# Patient Record
Sex: FEMALE | Race: WHITE | Employment: FULL TIME | URBAN - METROPOLITAN AREA
[De-identification: names, ages, dates, MRNs, and addresses within clinical notes are randomized per-mention and may not be internally consistent; named-entity substitution may affect disease eponyms.]

---

## 2017-12-20 ENCOUNTER — HOSPITAL ENCOUNTER (OUTPATIENT)
Dept: MAMMOGRAPHY | Facility: CLINIC | Age: 67
Discharge: HOME | End: 2017-12-20
Payer: MEDICARE

## 2017-12-20 DIAGNOSIS — Z12.31 VISIT FOR SCREENING MAMMOGRAM: ICD-10-CM

## 2017-12-20 PROCEDURE — G0202 SCR MAMMO BI INCL CAD: HCPCS

## 2018-12-21 ENCOUNTER — HOSPITAL ENCOUNTER (OUTPATIENT)
Dept: MAMMOGRAPHY | Facility: CLINIC | Age: 68
Discharge: HOME | End: 2018-12-21
Attending: FAMILY MEDICINE | Admitting: FAMILY MEDICINE
Payer: MEDICARE

## 2018-12-21 DIAGNOSIS — Z12.31 VISIT FOR SCREENING MAMMOGRAM: ICD-10-CM

## 2018-12-21 PROCEDURE — 77067 SCR MAMMO BI INCL CAD: CPT

## 2019-12-23 ENCOUNTER — HOSPITAL ENCOUNTER (OUTPATIENT)
Dept: MAMMOGRAPHY | Facility: CLINIC | Age: 69
Discharge: HOME | End: 2019-12-23
Attending: FAMILY MEDICINE | Admitting: FAMILY MEDICINE
Payer: COMMERCIAL

## 2019-12-23 DIAGNOSIS — Z12.31 VISIT FOR SCREENING MAMMOGRAM: ICD-10-CM

## 2019-12-23 PROCEDURE — 77067 SCR MAMMO BI INCL CAD: CPT

## 2020-01-13 ENCOUNTER — TRANSFERRED RECORDS (OUTPATIENT)
Dept: HEALTH INFORMATION MANAGEMENT | Facility: CLINIC | Age: 70
End: 2020-01-13

## 2020-01-20 ENCOUNTER — OFFICE VISIT (OUTPATIENT)
Dept: FAMILY MEDICINE | Facility: CLINIC | Age: 70
End: 2020-01-20
Payer: COMMERCIAL

## 2020-01-20 VITALS
TEMPERATURE: 97.9 F | HEART RATE: 95 BPM | BODY MASS INDEX: 23.74 KG/M2 | HEIGHT: 63 IN | OXYGEN SATURATION: 97 % | DIASTOLIC BLOOD PRESSURE: 84 MMHG | SYSTOLIC BLOOD PRESSURE: 132 MMHG | WEIGHT: 134 LBS

## 2020-01-20 DIAGNOSIS — R49.0 DYSPHONIA: ICD-10-CM

## 2020-01-20 DIAGNOSIS — R03.0 ELEVATED BLOOD PRESSURE READING WITHOUT DIAGNOSIS OF HYPERTENSION: ICD-10-CM

## 2020-01-20 DIAGNOSIS — J38.3 VOCAL CORD MASS: ICD-10-CM

## 2020-01-20 DIAGNOSIS — Z01.818 PREOP GENERAL PHYSICAL EXAM: Primary | ICD-10-CM

## 2020-01-20 LAB
ALBUMIN SERPL-MCNC: 3.5 G/DL (ref 3.4–5)
ALP SERPL-CCNC: 89 U/L (ref 40–150)
ALT SERPL W P-5'-P-CCNC: 29 U/L (ref 0–50)
ANION GAP SERPL CALCULATED.3IONS-SCNC: 6 MMOL/L (ref 3–14)
AST SERPL W P-5'-P-CCNC: 22 U/L (ref 0–45)
BILIRUB SERPL-MCNC: 0.3 MG/DL (ref 0.2–1.3)
BUN SERPL-MCNC: 9 MG/DL (ref 7–30)
CALCIUM SERPL-MCNC: 8.8 MG/DL (ref 8.5–10.1)
CHLORIDE SERPL-SCNC: 108 MMOL/L (ref 94–109)
CO2 SERPL-SCNC: 26 MMOL/L (ref 20–32)
CREAT SERPL-MCNC: 0.82 MG/DL (ref 0.52–1.04)
ERYTHROCYTE [DISTWIDTH] IN BLOOD BY AUTOMATED COUNT: 12.6 % (ref 10–15)
GFR SERPL CREATININE-BSD FRML MDRD: 72 ML/MIN/{1.73_M2}
GLUCOSE SERPL-MCNC: 111 MG/DL (ref 70–99)
HCT VFR BLD AUTO: 40.7 % (ref 35–47)
HGB BLD-MCNC: 13.3 G/DL (ref 11.7–15.7)
MCH RBC QN AUTO: 30 PG (ref 26.5–33)
MCHC RBC AUTO-ENTMCNC: 32.7 G/DL (ref 31.5–36.5)
MCV RBC AUTO: 92 FL (ref 78–100)
PLATELET # BLD AUTO: 200 10E9/L (ref 150–450)
POTASSIUM SERPL-SCNC: 3.9 MMOL/L (ref 3.4–5.3)
PROT SERPL-MCNC: 7 G/DL (ref 6.8–8.8)
RBC # BLD AUTO: 4.43 10E12/L (ref 3.8–5.2)
SODIUM SERPL-SCNC: 140 MMOL/L (ref 133–144)
WBC # BLD AUTO: 4.2 10E9/L (ref 4–11)

## 2020-01-20 PROCEDURE — 93000 ELECTROCARDIOGRAM COMPLETE: CPT | Performed by: INTERNAL MEDICINE

## 2020-01-20 PROCEDURE — 80053 COMPREHEN METABOLIC PANEL: CPT | Performed by: INTERNAL MEDICINE

## 2020-01-20 PROCEDURE — 85027 COMPLETE CBC AUTOMATED: CPT | Performed by: INTERNAL MEDICINE

## 2020-01-20 PROCEDURE — 99203 OFFICE O/P NEW LOW 30 MIN: CPT | Performed by: INTERNAL MEDICINE

## 2020-01-20 PROCEDURE — 36415 COLL VENOUS BLD VENIPUNCTURE: CPT | Performed by: INTERNAL MEDICINE

## 2020-01-20 ASSESSMENT — MIFFLIN-ST. JEOR: SCORE: 1096.95

## 2020-01-20 NOTE — LETTER
[address] [facility]  [address]  [place]  [address]  Tel: [phone]    January 22, 2020    Cherrie Crespo  [address]   [facility] [address]        Dear Ms. Crespo,    The results of your recent Electrolytes are normal, Kidney function normal , liver test normal, calcium and total protein normal  CBC shows normal white blood cell count ;normal hemoglobin hematocrit; there is no anemia and normal platelet count.      Dr. Puckett/[facility]      Enclosure: Lab Results  Results for orders placed or performed in visit on 01/20/20   Comprehensive metabolic panel (BMP + Alb, Alk Phos, ALT, AST, Total. Bili, TP)     Status: Abnormal   Result Value Ref Range    Sodium 140 133 - 144 mmol/L    Potassium 3.9 3.4 - 5.3 mmol/L    Chloride 108 94 - 109 mmol/L    Carbon Dioxide 26 20 - 32 mmol/L    Anion Gap 6 3 - 14 mmol/L    Glucose 111 (H) 70 - 99 mg/dL    Urea Nitrogen 9 7 - 30 mg/dL    Creatinine 0.82 0.52 - 1.04 mg/dL    GFR Estimate 72 >60 mL/min/[1.73_m2]    GFR Estimate If Black 84 >60 mL/min/[1.73_m2]    Calcium 8.8 8.5 - 10.1 mg/dL    Bilirubin Total 0.3 0.2 - 1.3 mg/dL    Albumin 3.5 3.4 - 5.0 g/dL    Protein Total 7.0 6.8 - 8.8 g/dL    Alkaline Phosphatase 89 40 - 150 U/L    ALT 29 0 - 50 U/L    AST 22 0 - 45 U/L   CBC with platelets     Status: None   Result Value Ref Range    WBC 4.2 4.0 - 11.0 10e9/L    RBC Count 4.43 3.8 - 5.2 10e12/L    Hemoglobin 13.3 11.7 - 15.7 g/dL    Hematocrit 40.7 35.0 - 47.0 %    MCV 92 78 - 100 fl    MCH 30.0 26.5 - 33.0 pg    MCHC 32.7 31.5 - 36.5 g/dL    RDW 12.6 10.0 - 15.0 %    Platelet Count 200 150 - 450 10e9/L

## 2020-01-20 NOTE — PROGRESS NOTES
[facility]  [address] [facility] [address]  [phone]  Dept: [phone]    PRE-OP EVALUATION:  Today's date: 2020    Cherrieperry Crespo (: 1950) presents for pre-operative evaluation assessment as requested by Hudson Hoffman.  She requires evaluation and anesthesia risk assessment prior to undergoing surgery/procedure for treatment of MICRO DIRECT LARYNGOSCOPY. EXCISION OF VOCAL CORD MASS  (CO2 LASER).    Proposed Surgery/ Procedure: MICRO DIRECT LARYNGOSCOPY. EXCISION OF VOCAL CORD MASS  (CO2 LASER)  Date of Surgery/ Procedure: 2020  Time of Surgery/ Procedure: 1:25 PM  Hospital/Surgical Facility: [facility]    Primary Physician: Gustavo Crespo  Type of Anesthesia Anticipated: General    Patient has a Health Care Directive or Living Will:  YES at home  & executers     1. NO - Do you have a history of heart attack, stroke, stent, bypass or surgery on an artery in the head, neck, heart or legs?  2. NO - Do you ever have any pain or discomfort in your chest?  3. NO - Do you have a history of  Heart Failure?  4. NO - Are you troubled by shortness of breath when: walking on the level, up a slight hill or at night?  5. NO - Do you currently have a cold, bronchitis or other respiratory infection?  6. NO - Do you have a cough, shortness of breath or wheezing?  7. NO - Do you sometimes get pains in the calves of your legs when you walk?  8. NO - Do you or anyone in your family have previous history of blood clots?  9. NO - Do you or does anyone in your family have a serious bleeding problem such as prolonged bleeding following surgeries or cuts?  10. NO - Have you ever had problems with anemia or been told to take iron pills?  11. NO - Have you had any abnormal blood loss such as black, tarry or bloody stools, or abnormal vaginal bleeding?  12. NO - Have you ever had a blood transfusion?  13. NO - Have you or any of your relatives ever had problems with  anesthesia?  14. NO - Do you have sleep apnea, excessive snoring or daytime drowsiness?  15. NO - Do you have any prosthetic heart valves?  16. NO - Do you have prosthetic joints?  17. NO - Is there any chance that you may be pregnant?      HPI:     HPI related to upcoming procedure: Patient presenting for follow-up preop clearance.  She describes she is very physically active she goes to the gym does walk on a treadmill she can climb a flight or 2 of stairs easily and walk horizontal planes while today she denies any dyspnea or shortness of breath or chest pain at rest or with exertion denies any palpitations presyncope or syncope.  Denies any history of blood clots denies any history of complications from area.  Denies any history of bleeding diathesis.      HYPERTENSION - Patient has longstanding history of HTN , currently denies any symptoms referable to elevated blood pressure. Specifically denies chest pain, palpitations, dyspnea, orthopnea, PND or peripheral edema. Blood pressure readings have been in normal range. Current medication regimen is as listed below. Patient denies any side effects of medication.       MEDICAL HISTORY:     Patient Active Problem List    Diagnosis Date Noted     Vitamin D deficiency 09/29/2014     Priority: Medium     Problem list name updated by automated process. Provider to review       HTN (Hypertension); goal < 140/90 02/25/2009     Priority: Medium     Disorder of bone and cartilage 04/30/2008     Priority: Medium     Problem list name updated by automated process. Provider to review       Hyperlipidemia 09/07/2004     Priority: Medium     Problem list name updated by automated process. Provider to review        Past Medical History:   Diagnosis Date     HTN, goal below 140/90      HYPERLIPIDEMIA NEC/NOS 9/7/2004     OSTEOPENIA 4/30/2008     Past Surgical History:   Procedure Laterality Date     CHOLECYSTECTOMY, OPEN       HYSTERECTOMY, PAP NO LONGER INDICATED      For  "dysfunctional uterine bleeding     Current Outpatient Medications   Medication Sig Dispense Refill     CALCIUM 600 MG OR TABS 2   tabs Daily 0 0     UNABLE TO FIND MEDICATION NAME:\"[place] supplement\" for blood pressure       UNABLE TO FIND MEDICATION NAME: skeletal structure       VITAMIN D, CHOLECALCIFEROL, PO Take 1,000 Units by mouth daily       OTC products: none    Allergies   Allergen Reactions     Penicillins Shortness Of Breath and Rash     Sulfa Drugs Shortness Of Breath and Rash      Latex Allergy: NO    Social History     Tobacco Use     Smoking status: Never Smoker     Smokeless tobacco: Never Used   Substance Use Topics     Alcohol use: No     Comment: No     History   Drug Use No       REVIEW OF SYSTEMS:   Constitutional, neuro, ENT, endocrine, pulmonary, cardiac, gastrointestinal, genitourinary, musculoskeletal, integument and psychiatric systems are negative, except as otherwise noted.    EXAM:   /84 (BP Location: Right arm, Patient Position: Chair, Cuff Size: Adult Regular)   Pulse 95   Temp 97.9  F (36.6  C) (Oral)   Ht 1.6 m (5' 3\")   Wt 60.8 kg (134 lb)   SpO2 97%   BMI 23.74 kg/m      GENERAL APPEARANCE: healthy, alert and no distress     EYES: EOMI, PERRL     HENT: ear canals and TM's normal and nose and mouth without ulcers or lesions     NECK: no adenopathy, no asymmetry, masses, or scars and thyroid normal to palpation     RESP: lungs clear to auscultation - no rales, rhonchi or wheezes     CV: regular rates and rhythm, normal S1 S2, no S3 or S4 and no murmur, click or rub     ABDOMEN:  soft, nontender, no HSM or masses and bowel sounds normal     MS: extremities normal- no gross deformities noted, no evidence of inflammation in joints, FROM in all extremities.     SKIN: no suspicious lesions or rashes     NEURO: Normal strength and tone, sensory exam grossly normal, mentation intact and speech normal     PSYCH: mentation appears normal. and affect normal/bright     " LYMPHATICS: No cervical adenopathy    DIAGNOSTICS:   EKG: appears normal, NSR, normal axis, normal intervals, no acute ST/T changes c/w ischemia, no LVH by voltage criteria, unchanged from previous tracings    Recent Labs   Lab Test 09/23/14  1348   HGB 14.8      POTASSIUM 4.5   CR 0.90        IMPRESSION:   Reason for surgery/procedure: MICRO DIRECT LARYNGOSCOPY. EXCISION OF VOCAL CORD MASS  (CO2 LASER)  Diagnosis/reason for consult: Preop clearance    The proposed surgical procedure is considered INTERMEDIATE risk.    REVISED CARDIAC RISK INDEX  The patient has the following serious cardiovascular risks for perioperative complications such as (MI, PE, VFib and 3  AV Block):  No serious cardiac risks  INTERPRETATION: 0 risks: Class I (very low risk - 0.4% complication rate)    The patient has the following additional risks for perioperative complications:  No identified additional risks  The ASCVD Risk score (Klaudia TURNER Jr., et al., 2013) failed to calculate for the following reasons:    Cannot find a previous HDL lab    Cannot find a previous total cholesterol lab  Elevated blood pressure seems to be well-controlled with lifestyle changes and also medications.  As reported by patient    ICD-10-CM    1. Preop general physical exam Z01.818 EKG 12-lead complete w/read - Clinics     CBC with platelets   2. 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 Dysphonia R49.0    4. Elevated blood pressure reading without diagnosis of hypertension R03.0 Comprehensive metabolic panel (BMP + Alb, Alk Phos, ALT, AST, Total. Bili, TP)       RECOMMENDATIONS:     --Consult hospital rounder / IM to assist post-op medical management    Cardiovascular Risk  Performs more than 4 METS she goes to the gym.  She pretty physically active..       --Patient is on no chronic medications    APPROVAL GIVEN to proceed with proposed procedure, without further diagnostic evaluation.  EKG reviewed.  Blood pressure seems well controlled on no medications.  She  reports her blood pressure are in the 120s systolic over 70s as outpatient [ no recording available] , last BP    124/84 04/26/2019 7:59 AM CDT     Labs reviewed CMP and CBC are within normal.  Patient was advised to continue monitor her blood pressures outpatient and send blood pressure readings to her PCP.       Signed Electronically by: Dominik Puckett MD    Copy of this evaluation report is provided to requesting physician.    [facility] Preop Guidelines    Revised Cardiac Risk Index

## 2020-01-22 NOTE — RESULT ENCOUNTER NOTE
Please notify patient of the following lab results  Cherrie,  Electrolytes are normal, Kidney function normal , liver test normal, calcium and total protein normal  CBC shows normal white blood cell count ;normal hemoglobin hematocrit; there is no anemia and normal platelet count.  Dr. Puckett

## 2020-01-31 ENCOUNTER — ANESTHESIA EVENT (OUTPATIENT)
Dept: SURGERY | Facility: CLINIC | Age: 70
End: 2020-01-31
Payer: COMMERCIAL

## 2020-01-31 ENCOUNTER — ANESTHESIA (OUTPATIENT)
Dept: SURGERY | Facility: CLINIC | Age: 70
End: 2020-01-31
Payer: COMMERCIAL

## 2020-01-31 ENCOUNTER — HOSPITAL ENCOUNTER (OUTPATIENT)
Facility: CLINIC | Age: 70
Discharge: HOME | End: 2020-01-31
Admitting: OTOLARYNGOLOGY
Payer: COMMERCIAL

## 2020-01-31 VITALS
OXYGEN SATURATION: 96 % | RESPIRATION RATE: 16 BRPM | TEMPERATURE: 97.3 F | WEIGHT: 133.8 LBS | BODY MASS INDEX: 22.84 KG/M2 | DIASTOLIC BLOOD PRESSURE: 84 MMHG | HEIGHT: 64 IN | SYSTOLIC BLOOD PRESSURE: 140 MMHG | HEART RATE: 63 BPM

## 2020-01-31 PROCEDURE — 25000125 ZZHC RX 250: Performed by: NURSE ANESTHETIST, CERTIFIED REGISTERED

## 2020-01-31 PROCEDURE — 25000128 H RX IP 250 OP 636: Performed by: ANESTHESIOLOGY

## 2020-01-31 PROCEDURE — 88305 TISSUE EXAM BY PATHOLOGIST: CPT | Performed by: OTOLARYNGOLOGY

## 2020-01-31 PROCEDURE — 25000128 H RX IP 250 OP 636: Performed by: NURSE ANESTHETIST, CERTIFIED REGISTERED

## 2020-01-31 PROCEDURE — 71000027 ZZH RECOVERY PHASE 2 EACH 15 MINS: Performed by: OTOLARYNGOLOGY

## 2020-01-31 PROCEDURE — 25000125 ZZHC RX 250: Performed by: OTOLARYNGOLOGY

## 2020-01-31 PROCEDURE — 40000170 ZZH STATISTIC PRE-PROCEDURE ASSESSMENT II: Performed by: OTOLARYNGOLOGY

## 2020-01-31 PROCEDURE — 71000012 ZZH RECOVERY PHASE 1 LEVEL 1 FIRST HR: Performed by: OTOLARYNGOLOGY

## 2020-01-31 PROCEDURE — 25800030 ZZH RX IP 258 OP 636: Performed by: ANESTHESIOLOGY

## 2020-01-31 PROCEDURE — 37000009 ZZH ANESTHESIA TECHNICAL FEE, EACH ADDTL 15 MIN: Performed by: OTOLARYNGOLOGY

## 2020-01-31 PROCEDURE — 25000128 H RX IP 250 OP 636: Performed by: SURGERY

## 2020-01-31 PROCEDURE — 25000566 ZZH SEVOFLURANE, EA 15 MIN: Performed by: OTOLARYNGOLOGY

## 2020-01-31 PROCEDURE — 25800030 ZZH RX IP 258 OP 636: Performed by: NURSE ANESTHETIST, CERTIFIED REGISTERED

## 2020-01-31 PROCEDURE — 88305 TISSUE EXAM BY PATHOLOGIST: CPT | Mod: 26 | Performed by: OTOLARYNGOLOGY

## 2020-01-31 PROCEDURE — 36000056 ZZH SURGERY LEVEL 3 1ST 30 MIN: Performed by: OTOLARYNGOLOGY

## 2020-01-31 PROCEDURE — 36000058 ZZH SURGERY LEVEL 3 EA 15 ADDTL MIN: Performed by: OTOLARYNGOLOGY

## 2020-01-31 PROCEDURE — 37000008 ZZH ANESTHESIA TECHNICAL FEE, 1ST 30 MIN: Performed by: OTOLARYNGOLOGY

## 2020-01-31 PROCEDURE — 27210794 ZZH OR GENERAL SUPPLY STERILE: Performed by: OTOLARYNGOLOGY

## 2020-01-31 RX ORDER — FENTANYL CITRATE 0.05 MG/ML
25-50 INJECTION, SOLUTION INTRAMUSCULAR; INTRAVENOUS
Status: DISCONTINUED | OUTPATIENT
Start: 2020-01-31 | End: 2020-01-31 | Stop reason: HOSPADM

## 2020-01-31 RX ORDER — DEXAMETHASONE SODIUM PHOSPHATE 4 MG/ML
INJECTION, SOLUTION INTRA-ARTICULAR; INTRALESIONAL; INTRAMUSCULAR; INTRAVENOUS; SOFT TISSUE PRN
Status: DISCONTINUED | OUTPATIENT
Start: 2020-01-31 | End: 2020-01-31

## 2020-01-31 RX ORDER — ONDANSETRON 4 MG/1
4 TABLET, ORALLY DISINTEGRATING ORAL EVERY 30 MIN PRN
Status: DISCONTINUED | OUTPATIENT
Start: 2020-01-31 | End: 2020-01-31 | Stop reason: HOSPADM

## 2020-01-31 RX ORDER — ONDANSETRON 2 MG/ML
4 INJECTION INTRAMUSCULAR; INTRAVENOUS EVERY 30 MIN PRN
Status: DISCONTINUED | OUTPATIENT
Start: 2020-01-31 | End: 2020-01-31 | Stop reason: HOSPADM

## 2020-01-31 RX ORDER — MEPERIDINE HYDROCHLORIDE 25 MG/ML
12.5 INJECTION INTRAMUSCULAR; INTRAVENOUS; SUBCUTANEOUS
Status: DISCONTINUED | OUTPATIENT
Start: 2020-01-31 | End: 2020-01-31 | Stop reason: HOSPADM

## 2020-01-31 RX ORDER — ONDANSETRON 2 MG/ML
INJECTION INTRAMUSCULAR; INTRAVENOUS PRN
Status: DISCONTINUED | OUTPATIENT
Start: 2020-01-31 | End: 2020-01-31

## 2020-01-31 RX ORDER — ACETAMINOPHEN 325 MG/1
650 TABLET ORAL
Status: DISCONTINUED | OUTPATIENT
Start: 2020-01-31 | End: 2020-01-31 | Stop reason: HOSPADM

## 2020-01-31 RX ORDER — NEOSTIGMINE METHYLSULFATE 1 MG/ML
VIAL (ML) INJECTION PRN
Status: DISCONTINUED | OUTPATIENT
Start: 2020-01-31 | End: 2020-01-31

## 2020-01-31 RX ORDER — HYDROMORPHONE HYDROCHLORIDE 1 MG/ML
.3-.5 INJECTION, SOLUTION INTRAMUSCULAR; INTRAVENOUS; SUBCUTANEOUS EVERY 5 MIN PRN
Status: DISCONTINUED | OUTPATIENT
Start: 2020-01-31 | End: 2020-01-31 | Stop reason: HOSPADM

## 2020-01-31 RX ORDER — HYDROMORPHONE HYDROCHLORIDE 1 MG/ML
.3-.5 INJECTION, SOLUTION INTRAMUSCULAR; INTRAVENOUS; SUBCUTANEOUS EVERY 10 MIN PRN
Status: DISCONTINUED | OUTPATIENT
Start: 2020-01-31 | End: 2020-01-31 | Stop reason: HOSPADM

## 2020-01-31 RX ORDER — LABETALOL HYDROCHLORIDE 5 MG/ML
10 INJECTION, SOLUTION INTRAVENOUS ONCE
Status: COMPLETED | OUTPATIENT
Start: 2020-01-31 | End: 2020-01-31

## 2020-01-31 RX ORDER — LIDOCAINE HYDROCHLORIDE 20 MG/ML
INJECTION, SOLUTION INFILTRATION; PERINEURAL PRN
Status: DISCONTINUED | OUTPATIENT
Start: 2020-01-31 | End: 2020-01-31

## 2020-01-31 RX ORDER — NALOXONE HYDROCHLORIDE 0.4 MG/ML
.1-.4 INJECTION, SOLUTION INTRAMUSCULAR; INTRAVENOUS; SUBCUTANEOUS
Status: DISCONTINUED | OUTPATIENT
Start: 2020-01-31 | End: 2020-01-31 | Stop reason: HOSPADM

## 2020-01-31 RX ORDER — LABETALOL HYDROCHLORIDE 5 MG/ML
10 INJECTION, SOLUTION INTRAVENOUS
Status: COMPLETED | OUTPATIENT
Start: 2020-01-31 | End: 2020-01-31

## 2020-01-31 RX ORDER — GLYCOPYRROLATE 0.2 MG/ML
INJECTION, SOLUTION INTRAMUSCULAR; INTRAVENOUS PRN
Status: DISCONTINUED | OUTPATIENT
Start: 2020-01-31 | End: 2020-01-31

## 2020-01-31 RX ORDER — MAGNESIUM HYDROXIDE 1200 MG/15ML
LIQUID ORAL PRN
Status: DISCONTINUED | OUTPATIENT
Start: 2020-01-31 | End: 2020-01-31 | Stop reason: HOSPADM

## 2020-01-31 RX ORDER — SODIUM CHLORIDE, SODIUM LACTATE, POTASSIUM CHLORIDE, CALCIUM CHLORIDE 600; 310; 30; 20 MG/100ML; MG/100ML; MG/100ML; MG/100ML
INJECTION, SOLUTION INTRAVENOUS CONTINUOUS
Status: DISCONTINUED | OUTPATIENT
Start: 2020-01-31 | End: 2020-01-31 | Stop reason: HOSPADM

## 2020-01-31 RX ORDER — ASPIRIN 81 MG/1
81 TABLET ORAL DAILY
Status: ON HOLD | COMMUNITY
End: 2020-08-09

## 2020-01-31 RX ORDER — ALBUTEROL SULFATE 0.83 MG/ML
2.5 SOLUTION RESPIRATORY (INHALATION) EVERY 4 HOURS PRN
Status: DISCONTINUED | OUTPATIENT
Start: 2020-01-31 | End: 2020-01-31 | Stop reason: HOSPADM

## 2020-01-31 RX ORDER — SODIUM CHLORIDE, SODIUM LACTATE, POTASSIUM CHLORIDE, CALCIUM CHLORIDE 600; 310; 30; 20 MG/100ML; MG/100ML; MG/100ML; MG/100ML
INJECTION, SOLUTION INTRAVENOUS CONTINUOUS PRN
Status: DISCONTINUED | OUTPATIENT
Start: 2020-01-31 | End: 2020-01-31

## 2020-01-31 RX ORDER — PROPOFOL 10 MG/ML
INJECTION, EMULSION INTRAVENOUS CONTINUOUS PRN
Status: DISCONTINUED | OUTPATIENT
Start: 2020-01-31 | End: 2020-01-31

## 2020-01-31 RX ORDER — FENTANYL CITRATE 50 UG/ML
25-50 INJECTION, SOLUTION INTRAMUSCULAR; INTRAVENOUS EVERY 5 MIN PRN
Status: DISCONTINUED | OUTPATIENT
Start: 2020-01-31 | End: 2020-01-31 | Stop reason: HOSPADM

## 2020-01-31 RX ADMIN — ROCURONIUM BROMIDE 10 MG: 10 INJECTION INTRAVENOUS at 14:40

## 2020-01-31 RX ADMIN — PHENYLEPHRINE HYDROCHLORIDE 50 MCG: 10 INJECTION INTRAVENOUS at 14:28

## 2020-01-31 RX ADMIN — SUCCINYLCHOLINE CHLORIDE 100 MG: 20 INJECTION, SOLUTION INTRAMUSCULAR; INTRAVENOUS; PARENTERAL at 14:02

## 2020-01-31 RX ADMIN — ROCURONIUM BROMIDE 10 MG: 10 INJECTION INTRAVENOUS at 14:19

## 2020-01-31 RX ADMIN — PHENYLEPHRINE HYDROCHLORIDE 100 MCG: 10 INJECTION INTRAVENOUS at 15:03

## 2020-01-31 RX ADMIN — PHENYLEPHRINE HYDROCHLORIDE 100 MCG: 10 INJECTION INTRAVENOUS at 14:59

## 2020-01-31 RX ADMIN — NEOSTIGMINE METHYLSULFATE 3 MG: 1 INJECTION, SOLUTION INTRAVENOUS at 15:22

## 2020-01-31 RX ADMIN — DEXAMETHASONE SODIUM PHOSPHATE 8 MG: 4 INJECTION, SOLUTION INTRA-ARTICULAR; INTRALESIONAL; INTRAMUSCULAR; INTRAVENOUS; SOFT TISSUE at 14:02

## 2020-01-31 RX ADMIN — LIDOCAINE HYDROCHLORIDE 100 MG: 20 INJECTION, SOLUTION INFILTRATION; PERINEURAL at 14:02

## 2020-01-31 RX ADMIN — PHENYLEPHRINE HYDROCHLORIDE 50 MCG: 10 INJECTION INTRAVENOUS at 14:26

## 2020-01-31 RX ADMIN — REMIFENTANIL HYDROCHLORIDE 0.1 MCG/KG/MIN: 1 INJECTION, POWDER, LYOPHILIZED, FOR SOLUTION INTRAVENOUS at 14:02

## 2020-01-31 RX ADMIN — FENTANYL CITRATE 50 MCG: 0.05 INJECTION, SOLUTION INTRAMUSCULAR; INTRAVENOUS at 15:54

## 2020-01-31 RX ADMIN — SODIUM CHLORIDE, POTASSIUM CHLORIDE, SODIUM LACTATE AND CALCIUM CHLORIDE: 600; 310; 30; 20 INJECTION, SOLUTION INTRAVENOUS at 13:47

## 2020-01-31 RX ADMIN — LABETALOL HYDROCHLORIDE 10 MG: 5 INJECTION INTRAVENOUS at 15:55

## 2020-01-31 RX ADMIN — PROPOFOL 200 MCG/KG/MIN: 10 INJECTION, EMULSION INTRAVENOUS at 14:02

## 2020-01-31 RX ADMIN — GLYCOPYRROLATE 0.6 MG: 0.2 INJECTION, SOLUTION INTRAMUSCULAR; INTRAVENOUS at 15:22

## 2020-01-31 RX ADMIN — ONDANSETRON 4 MG: 2 INJECTION INTRAMUSCULAR; INTRAVENOUS at 14:02

## 2020-01-31 RX ADMIN — SODIUM CHLORIDE, POTASSIUM CHLORIDE, SODIUM LACTATE AND CALCIUM CHLORIDE: 600; 310; 30; 20 INJECTION, SOLUTION INTRAVENOUS at 16:00

## 2020-01-31 RX ADMIN — LABETALOL HYDROCHLORIDE 10 MG: 5 INJECTION INTRAVENOUS at 16:19

## 2020-01-31 ASSESSMENT — MIFFLIN-ST. JEOR: SCORE: 1103.97

## 2020-01-31 NOTE — PROCEDURES
OpNote    PreOp Dx:  R vocal cord mass    PostOp Dx:  same    Surgical Procedure:  MDL with excisional bx of mass    Surgeon:  DON Rosario      No assistant    Anesthesia:  General-ET    Findings:  R anterior vocal cord mucosal mass extended across the anterior commissure and involved the L anterior cord.      EBL:  gtts    No complications.    R anterior and L anterior tissue bxs sent to pathology

## 2020-01-31 NOTE — ANESTHESIA CARE TRANSFER NOTE
Patient: Cherrie Crespo    Procedure(s):  MICRO DIRECT LARYNGOSCOPY  EXCISION OF VOCAL CORD MASS    Diagnosis: Vocal cord mass [J38.3]  Diagnosis Additional Information: No value filed.    Anesthesia Type:   General, ETT     Note:  Airway :Face Mask  Patient transferred to:PACU  Handoff Report: Identifed the Patient, Identified the Reponsible Provider, Reviewed the pertinent medical history, Discussed the surgical course, Reviewed Intra-OP anesthesia mangement and issues during anesthesia, Set expectations for post-procedure period and Allowed opportunity for questions and acknowledgement of understanding      Vitals: (Last set prior to Anesthesia Care Transfer)    CRNA VITALS  1/31/2020 1513 - 1/31/2020 1551      1/31/2020             Pulse:  82    SpO2:  96 %    Resp Rate (set):  10                Electronically Signed By: VINITA Osborn CRNA  January 31, 2020  3:51 PM

## 2020-01-31 NOTE — OR NURSING
Dr. Damon notified of patient's HTN despite dose of labetalol in PACU. Verbal order given for another dose of 10 mg of labetalol with goal to achieve diastolic <100.

## 2020-02-01 NOTE — OP NOTE
Procedure Date: 01/31/2020      PREOPERATIVE DIAGNOSIS:   Right vocal cord mass.      POSTOPERATIVE DIAGNOSIS:   Right vocal cord mass.      PROCEDURE:  Microscopic direct laryngoscopy with excisional biopsy of right vocal cord mass.      SURGEON:  Hudson Rosario MD.      ANESTHESIA:  General endotracheal.      FINDINGS:  A mucosal mass centered on the right superior vocal cord, medial edge of the vocal cord, extending across the commissure involving the left medial anterior vocal cord.  No necrosis noted.      INDICATIONS:  Ms. Crespo is a 69-year-old female with history of progressive dysphonia who was found to have a vocal cord mass on endoscopic clinical exam.  The above procedure was reviewed in detail with her including the risks, alternatives and benefits.  The risks discussed included risk of infection, anesthesia, hemorrhage, dental trauma, permanent dysphonia, aphonia, and the possibility that this is malignant and she will require more aggressive surgery, or radiation treatment.      DESCRIPTION OF PROCEDURE:  After meeting the patient and her friend in the preoperative area, they had their questions answered and she was taken to the operating room.  Once under adequate general endotracheal anesthesia with a #6 laser-safe tube, eye protection was placed with laser protection, the table was turned 90 degrees.  Head drape was applied and a Dedo laryngoscope was selected.  An upper dental guard was placed to protect the teeth and the Dedo scope was moistened and carefully placed in the oral cavity.  This was carefully advanced into the endolarynx and suspended with a Lewy suspension.  She had an anteriorly displaced larynx and in order to see the vocal cord mass and anterior commissure, pressure on the cricoid had to be applied.  A 0-degree rigid endoscope was used to examine the anterior commissure and video still picture was taken both before and after excisional biopsy.      The endoscope was removed  and the microscope brought into the field.  Right anterior vocal cord and left anterior vocal cord biopsies were taken and the laser was used to ablate the right vocal cord tissue only.  Laser was set at 4 thompson superpulse, continuous.  Care was taken to avoid bleeding any tissue on the left to prevent anterior webbing.  There was no visible abnormal tissue at the completion of ablation.  Adrenaline-soaked cottonoid 1:100,000 was placed on the vocal cords for several minutes for hemostasis.  This was removed and the endolarynx suctioned clear of any drainage or debris.  The Dedo laryngoscope and dental guard were removed and she was returned back to Anesthesia.      She tolerated the procedure well with no blood loss and no apparent complications.         FRANCK EPPS MD             D: 2020   T: 2020   MT:       Name:     CECIILA ABDI   MRN:      [id number]        Account:        [id number]   :      1950           Procedure Date: 2020      Document: [id number]

## 2020-02-03 LAB — COPATH REPORT: NORMAL

## 2020-02-13 NOTE — ANESTHESIA POSTPROCEDURE EVALUATION
Patient: Cherrie Crespo    Procedure(s):  MICRO DIRECT LARYNGOSCOPY with LASER  EXCISION OF VOCAL CORD MASS    Diagnosis:Vocal cord mass [J38.3]  Diagnosis Additional Information: No value filed.    Anesthesia Type:  General, ETT    Note:  Anesthesia Post Evaluation    Patient location during evaluation: PACU  Patient participation: Able to fully participate in evaluation  Level of consciousness: awake  Airway patency: patent  Cardiovascular status: acceptable  Respiratory status: acceptable  Hydration status: acceptable  PONV: none     Anesthetic complications: None          Last vitals:  Vitals:    01/31/20 1630 01/31/20 1640 01/31/20 1655   BP: (!) 173/86  (!) 140/84   Pulse: 63     Resp: 23 13 16   Temp: 36.3  C (97.3  F) 36.3  C (97.3  F)    SpO2: 92% 94% 96%         Electronically Signed By: Gwen Escalante  February 13, 2020  3:21 PM

## 2020-05-27 ENCOUNTER — HOSPITAL ENCOUNTER (EMERGENCY)
Facility: CLINIC | Age: 70
Discharge: HOME | End: 2020-05-27
Attending: EMERGENCY MEDICINE | Admitting: EMERGENCY MEDICINE
Payer: COMMERCIAL

## 2020-05-27 VITALS
HEART RATE: 79 BPM | RESPIRATION RATE: 22 BRPM | DIASTOLIC BLOOD PRESSURE: 107 MMHG | TEMPERATURE: 97.6 F | OXYGEN SATURATION: 100 % | BODY MASS INDEX: 22.15 KG/M2 | WEIGHT: 125 LBS | HEIGHT: 63 IN | SYSTOLIC BLOOD PRESSURE: 186 MMHG

## 2020-05-27 DIAGNOSIS — J38.3 VOCAL CORD MASS: ICD-10-CM

## 2020-05-27 PROCEDURE — 99283 EMERGENCY DEPT VISIT LOW MDM: CPT

## 2020-05-27 RX ORDER — FAMOTIDINE 20 MG/1
20 TABLET, FILM COATED ORAL 2 TIMES DAILY
Qty: 30 TABLET | Refills: 0 | Status: ON HOLD | OUTPATIENT
Start: 2020-05-27 | End: 2020-08-09

## 2020-05-27 RX ORDER — SUCRALFATE 1 G/1
1 TABLET ORAL 4 TIMES DAILY
Qty: 30 TABLET | Refills: 0 | Status: ON HOLD | OUTPATIENT
Start: 2020-05-27 | End: 2020-08-09

## 2020-05-27 ASSESSMENT — ENCOUNTER SYMPTOMS
TROUBLE SWALLOWING: 1
VOICE CHANGE: 0

## 2020-05-27 ASSESSMENT — MIFFLIN-ST. JEOR: SCORE: 1051.13

## 2020-05-27 NOTE — ED PROVIDER NOTES
"  History     Chief Complaint:  Difficulty Swallowing      HPI   Cherrie Crespo is a 70 year old female who presents for evaluation of difficulty swallowing. On 1/31/2020 the patient had a laryngoscopy and vocal cord mass excision performed, and yesterday she had a follow up with her ENT during which she had a repeat scope, and following this procedure she developed new difficulty swallowing. Since then she has been unable to tolerate solid foods and has only been able to swallow small amounts of fluid at a time. Due to her persistent difficulty swallowing today, the patient came into the ED for evaluation. She has had a hoarse voice since the vocal cord procedure in January and this is unchanged today.     Allergies:  Penicillins   Sulfa drugs      Medications:    Aspirin 81 g   Calcium   Vitamin D      Past Medical History:    Hypertension  Hyperlipidemia   Osteopenia  Vitamin D deficiency     Past Surgical History:    Cholecystectomy, open   Hysterectomy   Laryngoscopy with microscope   Laryngoscopy, excise vocal core lesion, combined     Family History:    CAD - Mother   Lung cancer - Father     Social History:  Tobacco use:    Never smoker   Alcohol use:    Negative   Drug use:    Negative    Marital status:       Accompanied to ED by:  Alone      Review of Systems   HENT: Positive for trouble swallowing. Negative for voice change.    All other systems reviewed and are negative.      Physical Exam   First Vitals:  BP: (!) 173/103  Pulse: 99  Temp: 97.6  F (36.4  C)  Resp: 22  Height: 160 cm (5' 3\")  Weight: 56.7 kg (125 lb)  SpO2: 98 %      Physical Exam  Constitutional: Alert, attentive, GCS 15  HENT:    Nose: Nose normal.    Mouth/Throat: Oropharynx is clear, mucous membranes are moist, hoarse voice, no tongue or lip swelling, handling her own secretions   Eyes: EOM are normal, anicteric, conjugate gaze  CV: regular rate and rhythm; no murmurs  Chest: Effort normal and breath sounds clear without " wheezing or rales, symmetric bilaterally   GI:  non tender. No distension. No guarding or rebound.    MSK: No LE edema, no tenderness to palpation of BLE.  Neurological: Alert, attentive, moving all extremities equally.   Skin: Skin is warm and dry.     Emergency Department Course     Emergency Department Course:  Nursing notes and vitals reviewed.  1559: I performed an exam of the patient as documented above.     Findings and plan explained to the Patient. Patient discharged home with instructions regarding supportive care, medications, and reasons to return. The importance of close follow-up was reviewed. The patient was prescribed Pepcid, Omeprazole, and Carafate.     Impression & Plan      Medical Decision Makin-year-old woman with past medical history significant for vocal cord mass status post resection 4 months prior presenting for evaluation of difficulty swallowing in the setting of having nasopharyngoscopy yesterday via ENT specialty care for follow-up.  She reports she has had no change in her voice since the surgery and it remains hoarse however since her scoping yesterday she has had some difficulty swallowing but no trouble breathing.  There is no stridor on exam she is handling her own secretions and is in no distress.  I did call the on-call provider, Dr. 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  This is sometimes associated with acid reflux which is why she was recommended to start taking her antacids.  Given her stability today with no difficulty breathing, Dr. Mckee felt she was safe for continued outpatient management.  I see no indication for emergent imaging or scoping at this time.  I will initiate her on Carafate, Pepcid and omeprazole for acid blockade as granulomas are associated with this and recommended follow-up with her ENT clinic.  Return precautions were reviewed, I recommended small frequent sips for  hydration.    Diagnosis:    ICD-10-CM   1. Vocal cord mass  J38.3      Disposition:  Discharged to home with Pepcid, Omeprazole, and Carafate.     Discharge Medications:   Details   famotidine (PEPCID) 20 MG tablet Take 1 tablet (20 mg) by mouth 2 times daily, Disp-30 tablet,R-0, Local Print      omeprazole (PRILOSEC) 20 MG DR capsule Take 1 capsule (20 mg) by mouth daily, Disp-30 capsule,R-0, Local Print      sucralfate (CARAFATE) 1 GM tablet Take 1 tablet (1 g) by mouth 4 times daily, Disp-30 tablet,R-0, Local Print           Solis Grace MD  [employer]  11:30 PM 05/27/20       Neeraj MAGALLON, am serving as a scribe at 4:00 PM on 5/27/2020 to document services personally performed by Dr. Grace, based on my observations and the provider's statements to me.     EMERGENCY DEPARTMENT       Solis Grace MD  05/27/20 [id number]

## 2020-05-27 NOTE — ED AVS SNAPSHOT
Emergency Department  [address] [place] [address]  Phone:  [phone]  Fax:  [phone]                                    Cherrie Crespo   MRN: [id number]    Department:   Emergency Department   Date of Visit:  5/27/2020           After Visit Summary Signature Page    I have received my discharge instructions, and my questions have been answered. I have discussed any challenges I see with this plan with the nurse or doctor.    ..........................................................................................................................................  Patient/Patient Representative Signature      ..........................................................................................................................................  Patient Representative Print Name and Relationship to Patient    ..................................................               ................................................  Date                                   Time    ..........................................................................................................................................  Reviewed by Signature/Title    ...................................................              ..............................................  Date                                               Time          22EPIC Rev 08/18

## 2020-05-27 NOTE — DISCHARGE INSTRUCTIONS
You should take the Pepcid, Prilosec and Carafate as prescribed.  You have a granuloma on your vocal cords and this can be caused by acid reflux which is why the ENT recommended this.  You should call Dr. Rosario's office in the morning to let her know you are having this trouble swallowing and to discuss the results of your scoping.  You should return the emergency room should you have worsening trouble swallowing, difficulty breathing or develop fever.

## 2020-05-27 NOTE — ED TRIAGE NOTES
Pt. Had an operation on vocal cords in January. Went for a check up yesterday and had a scope inserted thru nose, and today now is unable to swallow anything.

## 2020-07-02 ENCOUNTER — TRANSFERRED RECORDS (OUTPATIENT)
Dept: HEALTH INFORMATION MANAGEMENT | Facility: CLINIC | Age: 70
End: 2020-07-02

## 2020-07-06 ENCOUNTER — TRANSFERRED RECORDS (OUTPATIENT)
Dept: HEALTH INFORMATION MANAGEMENT | Facility: CLINIC | Age: 70
End: 2020-07-06

## 2020-07-10 ENCOUNTER — HOSPITAL ENCOUNTER (OUTPATIENT)
Facility: CLINIC | Age: 70
Discharge: HOME | End: 2020-07-10
Attending: OTOLARYNGOLOGY | Admitting: OTOLARYNGOLOGY
Payer: COMMERCIAL

## 2020-07-10 ENCOUNTER — ANESTHESIA EVENT (OUTPATIENT)
Dept: SURGERY | Facility: CLINIC | Age: 70
End: 2020-07-10
Payer: COMMERCIAL

## 2020-07-10 ENCOUNTER — ANESTHESIA (OUTPATIENT)
Dept: SURGERY | Facility: CLINIC | Age: 70
End: 2020-07-10
Payer: COMMERCIAL

## 2020-07-10 VITALS
OXYGEN SATURATION: 96 % | SYSTOLIC BLOOD PRESSURE: 124 MMHG | HEIGHT: 64 IN | HEART RATE: 82 BPM | TEMPERATURE: 98.2 F | DIASTOLIC BLOOD PRESSURE: 64 MMHG | WEIGHT: 129 LBS | BODY MASS INDEX: 22.02 KG/M2 | RESPIRATION RATE: 14 BRPM

## 2020-07-10 PROCEDURE — 88341 IMHCHEM/IMCYTCHM EA ADD ANTB: CPT | Performed by: OTOLARYNGOLOGY

## 2020-07-10 PROCEDURE — 37000008 ZZH ANESTHESIA TECHNICAL FEE, 1ST 30 MIN: Performed by: OTOLARYNGOLOGY

## 2020-07-10 PROCEDURE — 27210794 ZZH OR GENERAL SUPPLY STERILE: Performed by: OTOLARYNGOLOGY

## 2020-07-10 PROCEDURE — 71000027 ZZH RECOVERY PHASE 2 EACH 15 MINS: Performed by: OTOLARYNGOLOGY

## 2020-07-10 PROCEDURE — 37000009 ZZH ANESTHESIA TECHNICAL FEE, EACH ADDTL 15 MIN: Performed by: OTOLARYNGOLOGY

## 2020-07-10 PROCEDURE — 88305 TISSUE EXAM BY PATHOLOGIST: CPT | Performed by: OTOLARYNGOLOGY

## 2020-07-10 PROCEDURE — 25800030 ZZH RX IP 258 OP 636: Performed by: ANESTHESIOLOGY

## 2020-07-10 PROCEDURE — 88341 IMHCHEM/IMCYTCHM EA ADD ANTB: CPT | Mod: 26 | Performed by: OTOLARYNGOLOGY

## 2020-07-10 PROCEDURE — 25000125 ZZHC RX 250: Performed by: OTOLARYNGOLOGY

## 2020-07-10 PROCEDURE — 36000056 ZZH SURGERY LEVEL 3 1ST 30 MIN: Performed by: OTOLARYNGOLOGY

## 2020-07-10 PROCEDURE — 88342 IMHCHEM/IMCYTCHM 1ST ANTB: CPT | Mod: 26,76 | Performed by: PATHOLOGY

## 2020-07-10 PROCEDURE — 25000125 ZZHC RX 250: Performed by: NURSE ANESTHETIST, CERTIFIED REGISTERED

## 2020-07-10 PROCEDURE — 71000012 ZZH RECOVERY PHASE 1 LEVEL 1 FIRST HR: Performed by: OTOLARYNGOLOGY

## 2020-07-10 PROCEDURE — 25000128 H RX IP 250 OP 636: Performed by: NURSE ANESTHETIST, CERTIFIED REGISTERED

## 2020-07-10 PROCEDURE — 88305 TISSUE EXAM BY PATHOLOGIST: CPT | Mod: 26 | Performed by: OTOLARYNGOLOGY

## 2020-07-10 PROCEDURE — 88342 IMHCHEM/IMCYTCHM 1ST ANTB: CPT | Mod: 26 | Performed by: OTOLARYNGOLOGY

## 2020-07-10 PROCEDURE — 40000306 ZZH STATISTIC PRE PROC ASSESS II: Performed by: OTOLARYNGOLOGY

## 2020-07-10 PROCEDURE — 88342 IMHCHEM/IMCYTCHM 1ST ANTB: CPT | Performed by: OTOLARYNGOLOGY

## 2020-07-10 PROCEDURE — 88305 TISSUE EXAM BY PATHOLOGIST: CPT | Mod: 26,76 | Performed by: PATHOLOGY

## 2020-07-10 PROCEDURE — 36000058 ZZH SURGERY LEVEL 3 EA 15 ADDTL MIN: Performed by: OTOLARYNGOLOGY

## 2020-07-10 RX ORDER — MEPERIDINE HYDROCHLORIDE 25 MG/ML
12.5 INJECTION INTRAMUSCULAR; INTRAVENOUS; SUBCUTANEOUS
Status: DISCONTINUED | OUTPATIENT
Start: 2020-07-10 | End: 2020-07-10 | Stop reason: HOSPADM

## 2020-07-10 RX ORDER — HYDRALAZINE HYDROCHLORIDE 20 MG/ML
2.5-5 INJECTION INTRAMUSCULAR; INTRAVENOUS EVERY 10 MIN PRN
Status: DISCONTINUED | OUTPATIENT
Start: 2020-07-10 | End: 2020-07-10 | Stop reason: HOSPADM

## 2020-07-10 RX ORDER — GLYCOPYRROLATE 0.2 MG/ML
INJECTION, SOLUTION INTRAMUSCULAR; INTRAVENOUS PRN
Status: DISCONTINUED | OUTPATIENT
Start: 2020-07-10 | End: 2020-07-10

## 2020-07-10 RX ORDER — SODIUM CHLORIDE, SODIUM LACTATE, POTASSIUM CHLORIDE, CALCIUM CHLORIDE 600; 310; 30; 20 MG/100ML; MG/100ML; MG/100ML; MG/100ML
INJECTION, SOLUTION INTRAVENOUS CONTINUOUS
Status: DISCONTINUED | OUTPATIENT
Start: 2020-07-10 | End: 2020-07-10 | Stop reason: HOSPADM

## 2020-07-10 RX ORDER — ONDANSETRON 4 MG/1
4 TABLET, ORALLY DISINTEGRATING ORAL EVERY 30 MIN PRN
Status: DISCONTINUED | OUTPATIENT
Start: 2020-07-10 | End: 2020-07-10 | Stop reason: HOSPADM

## 2020-07-10 RX ORDER — LABETALOL 20 MG/4 ML (5 MG/ML) INTRAVENOUS SYRINGE
10
Status: DISCONTINUED | OUTPATIENT
Start: 2020-07-10 | End: 2020-07-10 | Stop reason: HOSPADM

## 2020-07-10 RX ORDER — NEOSTIGMINE METHYLSULFATE 1 MG/ML
VIAL (ML) INJECTION PRN
Status: DISCONTINUED | OUTPATIENT
Start: 2020-07-10 | End: 2020-07-10

## 2020-07-10 RX ORDER — EPINEPHRINE NASAL SOLUTION 1 MG/ML
SOLUTION NASAL PRN
Status: DISCONTINUED | OUTPATIENT
Start: 2020-07-10 | End: 2020-07-10 | Stop reason: HOSPADM

## 2020-07-10 RX ORDER — PROPOFOL 10 MG/ML
INJECTION, EMULSION INTRAVENOUS CONTINUOUS PRN
Status: DISCONTINUED | OUTPATIENT
Start: 2020-07-10 | End: 2020-07-10

## 2020-07-10 RX ORDER — ONDANSETRON 2 MG/ML
INJECTION INTRAMUSCULAR; INTRAVENOUS PRN
Status: DISCONTINUED | OUTPATIENT
Start: 2020-07-10 | End: 2020-07-10

## 2020-07-10 RX ORDER — LIDOCAINE HYDROCHLORIDE 10 MG/ML
INJECTION, SOLUTION INFILTRATION; PERINEURAL PRN
Status: DISCONTINUED | OUTPATIENT
Start: 2020-07-10 | End: 2020-07-10

## 2020-07-10 RX ORDER — DEXAMETHASONE SODIUM PHOSPHATE 10 MG/ML
10 INJECTION, SOLUTION INTRAMUSCULAR; INTRAVENOUS ONCE
Status: DISCONTINUED | OUTPATIENT
Start: 2020-07-10 | End: 2020-07-10 | Stop reason: HOSPADM

## 2020-07-10 RX ORDER — FENTANYL CITRATE 50 UG/ML
INJECTION, SOLUTION INTRAMUSCULAR; INTRAVENOUS PRN
Status: DISCONTINUED | OUTPATIENT
Start: 2020-07-10 | End: 2020-07-10

## 2020-07-10 RX ORDER — ACETAMINOPHEN 325 MG/1
650 TABLET ORAL
Status: DISCONTINUED | OUTPATIENT
Start: 2020-07-10 | End: 2020-07-10 | Stop reason: HOSPADM

## 2020-07-10 RX ORDER — FENTANYL CITRATE 50 UG/ML
25-50 INJECTION, SOLUTION INTRAMUSCULAR; INTRAVENOUS
Status: DISCONTINUED | OUTPATIENT
Start: 2020-07-10 | End: 2020-07-10 | Stop reason: HOSPADM

## 2020-07-10 RX ORDER — ONDANSETRON 2 MG/ML
4 INJECTION INTRAMUSCULAR; INTRAVENOUS EVERY 30 MIN PRN
Status: DISCONTINUED | OUTPATIENT
Start: 2020-07-10 | End: 2020-07-10 | Stop reason: HOSPADM

## 2020-07-10 RX ORDER — NALOXONE HYDROCHLORIDE 0.4 MG/ML
.1-.4 INJECTION, SOLUTION INTRAMUSCULAR; INTRAVENOUS; SUBCUTANEOUS
Status: DISCONTINUED | OUTPATIENT
Start: 2020-07-10 | End: 2020-07-10 | Stop reason: HOSPADM

## 2020-07-10 RX ORDER — HYDRALAZINE HYDROCHLORIDE 20 MG/ML
INJECTION INTRAMUSCULAR; INTRAVENOUS PRN
Status: DISCONTINUED | OUTPATIENT
Start: 2020-07-10 | End: 2020-07-10

## 2020-07-10 RX ORDER — PROPOFOL 10 MG/ML
INJECTION, EMULSION INTRAVENOUS PRN
Status: DISCONTINUED | OUTPATIENT
Start: 2020-07-10 | End: 2020-07-10

## 2020-07-10 RX ORDER — HYDROMORPHONE HYDROCHLORIDE 1 MG/ML
.3-.5 INJECTION, SOLUTION INTRAMUSCULAR; INTRAVENOUS; SUBCUTANEOUS EVERY 10 MIN PRN
Status: DISCONTINUED | OUTPATIENT
Start: 2020-07-10 | End: 2020-07-10 | Stop reason: HOSPADM

## 2020-07-10 RX ORDER — LABETALOL HYDROCHLORIDE 5 MG/ML
INJECTION, SOLUTION INTRAVENOUS PRN
Status: DISCONTINUED | OUTPATIENT
Start: 2020-07-10 | End: 2020-07-10

## 2020-07-10 RX ORDER — DEXAMETHASONE SODIUM PHOSPHATE 4 MG/ML
INJECTION, SOLUTION INTRA-ARTICULAR; INTRALESIONAL; INTRAMUSCULAR; INTRAVENOUS; SOFT TISSUE PRN
Status: DISCONTINUED | OUTPATIENT
Start: 2020-07-10 | End: 2020-07-10

## 2020-07-10 RX ADMIN — PROPOFOL 80 MG: 10 INJECTION, EMULSION INTRAVENOUS at 14:39

## 2020-07-10 RX ADMIN — HYDRALAZINE HYDROCHLORIDE 10 MG: 20 INJECTION INTRAMUSCULAR; INTRAVENOUS at 15:25

## 2020-07-10 RX ADMIN — ONDANSETRON HYDROCHLORIDE 4 MG: 2 INJECTION, SOLUTION INTRAVENOUS at 15:34

## 2020-07-10 RX ADMIN — DEXAMETHASONE SODIUM PHOSPHATE 10 MG: 4 INJECTION, SOLUTION INTRA-ARTICULAR; INTRALESIONAL; INTRAMUSCULAR; INTRAVENOUS; SOFT TISSUE at 14:39

## 2020-07-10 RX ADMIN — GLYCOPYRROLATE 0.3 MG: 0.2 INJECTION, SOLUTION INTRAMUSCULAR; INTRAVENOUS at 15:34

## 2020-07-10 RX ADMIN — Medication 1 MG: at 15:39

## 2020-07-10 RX ADMIN — HYDRALAZINE HYDROCHLORIDE 10 MG: 20 INJECTION INTRAMUSCULAR; INTRAVENOUS at 15:19

## 2020-07-10 RX ADMIN — SODIUM CHLORIDE, POTASSIUM CHLORIDE, SODIUM LACTATE AND CALCIUM CHLORIDE: 600; 310; 30; 20 INJECTION, SOLUTION INTRAVENOUS at 14:29

## 2020-07-10 RX ADMIN — GLYCOPYRROLATE 0.1 MG: 0.2 INJECTION, SOLUTION INTRAMUSCULAR; INTRAVENOUS at 15:39

## 2020-07-10 RX ADMIN — Medication 3 MG: at 15:34

## 2020-07-10 RX ADMIN — ROCURONIUM BROMIDE 30 MG: 10 INJECTION INTRAVENOUS at 14:39

## 2020-07-10 RX ADMIN — ROCURONIUM BROMIDE 15 MG: 10 INJECTION INTRAVENOUS at 14:47

## 2020-07-10 RX ADMIN — GLYCOPYRROLATE 0.2 MG: 0.2 INJECTION, SOLUTION INTRAMUSCULAR; INTRAVENOUS at 14:39

## 2020-07-10 RX ADMIN — FENTANYL CITRATE 75 MCG: 50 INJECTION, SOLUTION INTRAMUSCULAR; INTRAVENOUS at 14:39

## 2020-07-10 RX ADMIN — MIDAZOLAM 1 MG: 1 INJECTION INTRAMUSCULAR; INTRAVENOUS at 14:32

## 2020-07-10 RX ADMIN — PROPOFOL 75 MCG/KG/MIN: 10 INJECTION, EMULSION INTRAVENOUS at 15:03

## 2020-07-10 RX ADMIN — LABETALOL HYDROCHLORIDE 5 MG: 5 INJECTION INTRAVENOUS at 15:08

## 2020-07-10 RX ADMIN — SODIUM CHLORIDE, POTASSIUM CHLORIDE, SODIUM LACTATE AND CALCIUM CHLORIDE: 600; 310; 30; 20 INJECTION, SOLUTION INTRAVENOUS at 13:00

## 2020-07-10 RX ADMIN — PROPOFOL 100 MG: 10 INJECTION, EMULSION INTRAVENOUS at 14:58

## 2020-07-10 RX ADMIN — LIDOCAINE HYDROCHLORIDE 30 MG: 10 INJECTION, SOLUTION INFILTRATION; PERINEURAL at 14:39

## 2020-07-10 ASSESSMENT — MIFFLIN-ST. JEOR: SCORE: 1077.2

## 2020-07-10 NOTE — DISCHARGE INSTRUCTIONS
GENERAL ANESTHESIA OR SEDATION ADULT DISCHARGE INSTRUCTIONS   SPECIAL PRECAUTIONS FOR 24 HOURS AFTER SURGERY    IT IS NOT UNUSUAL TO FEEL LIGHT-HEADED OR FAINT, UP TO 24 HOURS AFTER SURGERY OR WHILE TAKING PAIN MEDICATION.  IF YOU HAVE THESE SYMPTOMS; SIT FOR A FEW MINUTES BEFORE STANDING AND HAVE SOMEONE ASSIST YOU WHEN YOU GET UP TO WALK OR USE THE BATHROOM.    YOU SHOULD REST AND RELAX FOR THE NEXT 24 HOURS AND YOU MUST MAKE ARRANGEMENTS TO HAVE SOMEONE STAY WITH YOU FOR AT LEAST 24 HOURS AFTER YOUR DISCHARGE.  AVOID HAZARDOUS AND STRENUOUS ACTIVITIES.  DO NOT MAKE IMPORTANT DECISIONS FOR 24 HOURS.    DO NOT DRIVE ANY VEHICLE OR OPERATE MECHANICAL EQUIPMENT FOR 24 HOURS FOLLOWING THE END OF YOUR SURGERY.  EVEN THOUGH YOU MAY FEEL NORMAL, YOUR REACTIONS MAY BE AFFECTED BY THE MEDICATION YOU HAVE RECEIVED.    DO NOT DRINK ALCOHOLIC BEVERAGES FOR 24 HOURS FOLLOWING YOUR SURGERY.    DRINK CLEAR LIQUIDS (APPLE JUICE, GINGER ALE, 7-UP, BROTH, ETC.).  PROGRESS TO YOUR REGULAR DIET AS YOU FEEL ABLE.    YOU MAY HAVE A DRY MOUTH, A SORE THROAT, MUSCLES ACHES OR TROUBLE SLEEPING.  THESE SHOULD GO AWAY AFTER 24 HOURS.    CALL YOUR DOCTOR FOR ANY OF THE FOLLOWING:  SIGNS OF INFECTION (FEVER, GROWING TENDERNESS AT THE SURGERY SITE, A LARGE AMOUNT OF DRAINAGE OR BLEEDING, SEVERE PAIN, FOUL-SMELLING DRAINAGE, REDNESS OR SWELLING.    IT HAS BEEN OVER 8 TO 10 HOURS SINCE SURGERY AND YOU ARE STILL NOT ABLE TO URINATE (PASS WATER).     HOME CARE FOLLOWING MINOR SURGERY        DRESSING  Keep dressing dry and in place until your doctor instructs you to remove the dressing.    DRAINAGE  There should be minimal drainage. If bleeding should occur and soaks through the dressing apply a sterile, dry dressing over it and tape in place. If bleeding persists, apply gentle, steady pressure with your hand over the dressing for 5 minutes. If the bleeding does not stop, call your doctor.    SKIN CLOSURE  You may have stitches or special skin  closures. You will be given an appointment for the removal of any external stitches. You may have stitches under the skin which will absorb. You may have steri-strips over the incision. These look like thin tapes and will peel off in 5-7 days. If they don t after 7 days, you may carefully remove them. You may shower with the steri-strips but do not soak them, as with swimming or taking a bath. A protective covering of plastic may be placed over external stitches for showering.    NOTIFY YOUR PHYSICIAN IF YOU HAVE ANY OF THE FOLLOWING SYMPTOMS:    1. Fever greater than 101 degrees  2. Excessive bleeding or drainage  3. Disruption of the skin closure  4. Swelling, redness or excessive tenderness at the site  5. Severe pain  6. Drainage that is green, yellow, thick white or has a bad odor    Dr. EPPS:  [phone]

## 2020-07-10 NOTE — ANESTHESIA CARE TRANSFER NOTE
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    Anesthesia Type:   General     Note:  Airway :Face Mask  Patient transferred to:PACU  Handoff Report: Identifed the Patient, Identified the Reponsible Provider, Reviewed the pertinent medical history, Discussed the surgical course, Reviewed Intra-OP anesthesia mangement and issues during anesthesia, Set expectations for post-procedure period and Allowed opportunity for questions and acknowledgement of understanding      Vitals: (Last set prior to Anesthesia Care Transfer)    CRNA VITALS  7/10/2020 1529 - 7/10/2020 1604      7/10/2020             SpO2:  98 %                Electronically Signed By: VINITA Xiong CRNA  July 10, 2020  4:04 PM

## 2020-07-10 NOTE — OR NURSING
Pt had a covid test done on 7/6, Dr Lazo was informed and he felt another covid test isn't needed.

## 2020-07-10 NOTE — ANESTHESIA PREPROCEDURE EVALUATION
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]    Procedure(s):  LARYNGOSCOPY, WITH BIOPSY    Past Medical History:   Diagnosis Date     HTN, goal below 140/90      HYPERLIPIDEMIA NEC/NOS 2004     OSTEOPENIA 2008     Past Surgical History:   Procedure Laterality Date     CHOLECYSTECTOMY, OPEN       HYSTERECTOMY, PAP NO LONGER INDICATED      For dysfunctional uterine bleeding     LARYNGOSCOPY WITH MICROSCOPE N/A 2020    Procedure: MICRO DIRECT LARYNGOSCOPY with LASER;  Surgeon: Hudson Rosario MD;  Location:  OR     LARYNGOSCOPY, EXCISE VOCAL CORD LESION, COMBINED N/A 2020    Procedure: EXCISION OF VOCAL CORD MASS;  Surgeon: Hudson Rosario MD;  Location:  OR     Anesthesia Evaluation     . Pt has had prior anesthetic.     History of anesthetic complications   - difficult intubation        ROS/MED HX    ENT/Pulmonary:     (+), . Other pulmonary disease VC mass.    Neurologic:       Cardiovascular:     (+) Dyslipidemia, hypertension----. : . . . :. .       METS/Exercise Tolerance:     Hematologic:         Musculoskeletal:         GI/Hepatic:         Renal/Genitourinary:         Endo:         Psychiatric:         Infectious Disease:         Malignancy:         Other:                          Physical Exam      Airway   Mallampati: II  TM distance: >3 FB  Neck ROM: full    Dental     Cardiovascular       Pulmonary             Lab Results   Component Value Date    WBC 4.2 2020    HGB 13.3 2020    HCT 40.7 2020     2020     2020    POTASSIUM 3.9 2020    CHLORIDE 108 2020    CO2 26 2020    BUN 9 2020    CR 0.82 2020     (H) 2020    LOKESH 8.8 2020    ALBUMIN 3.5 2020    PROTTOTAL 7.0 2020    ALT 29 2020    AST 22 2020    ALKPHOS 89 2020    BILITOTAL 0.3 2020    TSH  "1.10 09/23/2014       Preop Vitals  BP Readings from Last 3 Encounters:   05/27/20 (!) 186/107   01/31/20 (!) 140/84   01/20/20 132/84    Pulse Readings from Last 3 Encounters:   05/27/20 79   01/31/20 63   01/20/20 95      Resp Readings from Last 3 Encounters:   05/27/20 22   01/31/20 16   12/19/11 16    SpO2 Readings from Last 3 Encounters:   05/27/20 100%   01/31/20 96%   01/20/20 97%      Temp Readings from Last 1 Encounters:   05/27/20 97.6  F (36.4  C) (Tympanic)    Ht Readings from Last 1 Encounters:   05/27/20 1.6 m (5' 3\")      Wt Readings from Last 1 Encounters:   05/27/20 56.7 kg (125 lb)    Estimated body mass index is 22.14 kg/m  as calculated from the following:    Height as of 5/27/20: 1.6 m (5' 3\").    Weight as of 5/27/20: 56.7 kg (125 lb).       Anesthesia Plan      History & Physical Review  History and physical reviewed and following examination; no interval change.    ASA Status:  2 .    NPO Status:  > 8 hours    Plan for General with Intravenous and Propofol induction. Maintenance will be Balanced.    PONV prophylaxis:  Ondansetron (or other 5HT-3) and Dexamethasone or Solumedrol  Additional equipment: Videolaryngoscope and Difficult Airway Cart        Postoperative Care  Postoperative pain management:  IV analgesics.      Consents  Anesthetic plan, risks, benefits and alternatives discussed with:  Patient..                 Casimiro Lazo MD                    .  "

## 2020-07-10 NOTE — ANESTHESIA POSTPROCEDURE EVALUATION
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    Anesthesia Type:  General    Note:  Anesthesia Post Evaluation    Patient location during evaluation: PACU  Patient participation: Able to fully participate in evaluation  Level of consciousness: awake  Pain management: adequate  Airway patency: patent  Cardiovascular status: acceptable  Respiratory status: acceptable  Hydration status: acceptable  PONV: none             Last vitals:  Vitals:    07/10/20 1610 07/10/20 1615 07/10/20 1620   BP: 127/66 127/62 126/63   Pulse: 86 79 76   Resp: 27 12 13   Temp:      SpO2: 99% 100% 100%         Electronically Signed By: Casimiro Lazo MD  July 10, 2020  4:30 PM

## 2020-07-10 NOTE — OP NOTE
Procedure Date: 07/10/2020      PREOPERATIVE DIAGNOSIS:  Vocal cord mass.      SURGICAL PROCEDURE:  Microscopic direct laryngoscopy with biopsies.      SURGEON:  Hudson Rosario MD.      ANESTHESIA:  General endotracheal.      FINDINGS:   1.  Anteriorly displaced larynx.   2.  Friable tissue obscuring the anterior half of both vocal cords with extension into the subglottis and left ventricle.      INDICATIONS:  Ms. 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  Pathology biopsies were limited, showed dysplasia, but no carcinoma.  Expectant management was discussed, but with the progressive enlargement and dysphonia rebiopsy was recommended.  After reviewing the risks, alternatives, and benefits of this procedure she had her questions answered and wished to proceed.  The risks discussed included the risk of infection, anesthesia, hemorrhage, chronic aphonia, loss of airway, death, and the possibility this is malignant and she may require a laryngectomy or other aggressive oncologic treatments.      DESCRIPTION OF PROCEDURE:  After meeting the patient and her brother-in-law in the preoperative area, they had their questions answered and she was taken to the operating room.  She was intubated with a 5.5 endotracheal tube with a GlideScope with minimal difficulty.  Table was turned and eye protection was placed.  Universal protocol was then observed and all were in agreement.      Head drape was applied and an upper dental guard was used.  A Dedo laryngoscope was moistened and carefully inserted into the oral cavity.  This was advanced into the endolarynx, but did not bring the vocal cord area into view.  Despite anterior cricoid pressure, the view was too limited, so I brought that out and switched to a Darnell scope.  This was advanced into the endolarynx and when the vocal cord region was brought into view this was placed in suspension.  There  was friable tissue throughout the anterior larynx obscuring normal vocal cord appearance.  There appeared to be friable tissue below the cords and extending into the anterior commissure.  There was also friable tissue in the left ventricle.  Biopsies of the right vocal cord, left vocal cord, and left ventricle were taken and sent separately.  Cottonoid soaked in adrenalin 1:1000 was placed for 5 minutes for hemostasis.  After removing this, there seemed to be no bleeding.      She tolerated the procedure well with an EBL of 5 mL.  No significant complications.         FRANCK EPPS MD             D: 07/10/2020   T: 07/10/2020   MT:       Name:     CECILIA ABDI   MRN:      [id number]        Account:        [id number]   :      1950           Procedure Date: 07/10/2020      Document: [id number]

## 2020-07-10 NOTE — PROCEDURES
OpNote    PreOp Dx:  Vocal cord mass    PostOp Dx:  same    Surgical Procedure:  MDL with biopsy of vocal cord mass    Surgeon:  DON Rosario      No assistant    Anesthesia:  General-ET    Findings:  Anteriorly displaced larynx  R and L vocal mass obscuring the anterior vocal cords and extending into the subglottis. L friable mass in the L ventricle.     EBL:  5 ml    No complications.    3 biopsies sent: R vocal cord, L vocal cord, and L false cord tissue sent to pathology

## 2020-07-16 LAB — COPATH REPORT: NORMAL

## 2020-08-09 ENCOUNTER — HOSPITAL ENCOUNTER (INPATIENT)
Facility: CLINIC | Age: 70
LOS: 16 days | Discharge: HOSPICE | DRG: 013 | End: 2020-08-25
Attending: EMERGENCY MEDICINE | Admitting: INTERNAL MEDICINE
Payer: COMMERCIAL

## 2020-08-09 ENCOUNTER — ANESTHESIA EVENT (OUTPATIENT)
Dept: SURGERY | Facility: CLINIC | Age: 70
DRG: 013 | End: 2020-08-09
Payer: COMMERCIAL

## 2020-08-09 ENCOUNTER — ANESTHESIA (OUTPATIENT)
Dept: SURGERY | Facility: CLINIC | Age: 70
DRG: 013 | End: 2020-08-09
Payer: COMMERCIAL

## 2020-08-09 DIAGNOSIS — Z51.5 END OF LIFE CARE: Primary | ICD-10-CM

## 2020-08-09 DIAGNOSIS — C14.0 THROAT CANCER (H): ICD-10-CM

## 2020-08-09 PROBLEM — J98.8 AIRWAY OBSTRUCTION, ANATOMIC: Status: ACTIVE | Noted: 2020-08-09

## 2020-08-09 LAB
ABO + RH BLD: NORMAL
ABO + RH BLD: NORMAL
ANION GAP SERPL CALCULATED.3IONS-SCNC: 3 MMOL/L (ref 3–14)
APTT PPP: 32 SEC (ref 22–37)
BASOPHILS # BLD AUTO: 0 10E9/L (ref 0–0.2)
BASOPHILS NFR BLD AUTO: 1 %
BLD GP AB SCN SERPL QL: NORMAL
BLOOD BANK CMNT PATIENT-IMP: NORMAL
BUN SERPL-MCNC: 11 MG/DL (ref 7–30)
CALCIUM SERPL-MCNC: 8.9 MG/DL (ref 8.5–10.1)
CHLORIDE SERPL-SCNC: 106 MMOL/L (ref 94–109)
CO2 SERPL-SCNC: 28 MMOL/L (ref 20–32)
CREAT SERPL-MCNC: 0.92 MG/DL (ref 0.52–1.04)
DIFFERENTIAL METHOD BLD: ABNORMAL
EOSINOPHIL # BLD AUTO: 0.1 10E9/L (ref 0–0.7)
EOSINOPHIL NFR BLD AUTO: 2.9 %
ERYTHROCYTE [DISTWIDTH] IN BLOOD BY AUTOMATED COUNT: 12.3 % (ref 10–15)
GFR SERPL CREATININE-BSD FRML MDRD: 63 ML/MIN/{1.73_M2}
GLUCOSE BLDC GLUCOMTR-MCNC: 144 MG/DL (ref 70–99)
GLUCOSE BLDC GLUCOMTR-MCNC: 170 MG/DL (ref 70–99)
GLUCOSE SERPL-MCNC: 129 MG/DL (ref 70–99)
HCT VFR BLD AUTO: 39.8 % (ref 35–47)
HGB BLD-MCNC: 13.2 G/DL (ref 11.7–15.7)
IMM GRANULOCYTES # BLD: 0 10E9/L (ref 0–0.4)
IMM GRANULOCYTES NFR BLD: 0.3 %
INR PPP: 1 (ref 0.86–1.14)
LABORATORY COMMENT REPORT: NORMAL
LYMPHOCYTES # BLD AUTO: 0.7 10E9/L (ref 0.8–5.3)
LYMPHOCYTES NFR BLD AUTO: 21 %
MCH RBC QN AUTO: 29.2 PG (ref 26.5–33)
MCHC RBC AUTO-ENTMCNC: 33.2 G/DL (ref 31.5–36.5)
MCV RBC AUTO: 88 FL (ref 78–100)
MONOCYTES # BLD AUTO: 0.4 10E9/L (ref 0–1.3)
MONOCYTES NFR BLD AUTO: 12.6 %
NEUTROPHILS # BLD AUTO: 1.9 10E9/L (ref 1.6–8.3)
NEUTROPHILS NFR BLD AUTO: 62.2 %
NRBC # BLD AUTO: 0 10*3/UL
NRBC BLD AUTO-RTO: 0 /100
PLATELET # BLD AUTO: 221 10E9/L (ref 150–450)
POTASSIUM SERPL-SCNC: 3.9 MMOL/L (ref 3.4–5.3)
RBC # BLD AUTO: 4.52 10E12/L (ref 3.8–5.2)
SARS-COV-2 RNA SPEC QL NAA+PROBE: NEGATIVE
SARS-COV-2 RNA SPEC QL NAA+PROBE: NORMAL
SODIUM SERPL-SCNC: 137 MMOL/L (ref 133–144)
SPECIMEN EXP DATE BLD: NORMAL
SPECIMEN SOURCE: NORMAL
SPECIMEN SOURCE: NORMAL
WBC # BLD AUTO: 3.1 10E9/L (ref 4–11)

## 2020-08-09 PROCEDURE — 85730 THROMBOPLASTIN TIME PARTIAL: CPT | Performed by: EMERGENCY MEDICINE

## 2020-08-09 PROCEDURE — 0B110F4 BYPASS TRACHEA TO CUTANEOUS WITH TRACHEOSTOMY DEVICE, OPEN APPROACH: ICD-10-PCS | Performed by: OTOLARYNGOLOGY

## 2020-08-09 PROCEDURE — 27210300 ZZH CANNULA HIGH FLOW, ADULT

## 2020-08-09 PROCEDURE — 85610 PROTHROMBIN TIME: CPT | Performed by: EMERGENCY MEDICINE

## 2020-08-09 PROCEDURE — C9803 HOPD COVID-19 SPEC COLLECT: HCPCS

## 2020-08-09 PROCEDURE — 25000125 ZZHC RX 250: Performed by: NURSE ANESTHETIST, CERTIFIED REGISTERED

## 2020-08-09 PROCEDURE — 25000128 H RX IP 250 OP 636: Performed by: INTERNAL MEDICINE

## 2020-08-09 PROCEDURE — 00000146 ZZHCL STATISTIC GLUCOSE BY METER IP

## 2020-08-09 PROCEDURE — 99285 EMERGENCY DEPT VISIT HI MDM: CPT | Mod: 25

## 2020-08-09 PROCEDURE — 86901 BLOOD TYPING SEROLOGIC RH(D): CPT | Performed by: EMERGENCY MEDICINE

## 2020-08-09 PROCEDURE — 27210794 ZZH OR GENERAL SUPPLY STERILE: Performed by: OTOLARYNGOLOGY

## 2020-08-09 PROCEDURE — 86850 RBC ANTIBODY SCREEN: CPT | Performed by: EMERGENCY MEDICINE

## 2020-08-09 PROCEDURE — 25800030 ZZH RX IP 258 OP 636: Performed by: ANESTHESIOLOGY

## 2020-08-09 PROCEDURE — 85025 COMPLETE CBC W/AUTO DIFF WBC: CPT | Performed by: EMERGENCY MEDICINE

## 2020-08-09 PROCEDURE — 37000008 ZZH ANESTHESIA TECHNICAL FEE, 1ST 30 MIN: Performed by: OTOLARYNGOLOGY

## 2020-08-09 PROCEDURE — 25000128 H RX IP 250 OP 636: Performed by: NURSE ANESTHETIST, CERTIFIED REGISTERED

## 2020-08-09 PROCEDURE — 25000566 ZZH SEVOFLURANE, EA 15 MIN: Performed by: OTOLARYNGOLOGY

## 2020-08-09 PROCEDURE — 80048 BASIC METABOLIC PNL TOTAL CA: CPT | Performed by: EMERGENCY MEDICINE

## 2020-08-09 PROCEDURE — 25800030 ZZH RX IP 258 OP 636: Performed by: NURSE ANESTHETIST, CERTIFIED REGISTERED

## 2020-08-09 PROCEDURE — 40000275 ZZH STATISTIC RCP TIME EA 10 MIN

## 2020-08-09 PROCEDURE — 99291 CRITICAL CARE FIRST HOUR: CPT | Performed by: INTERNAL MEDICINE

## 2020-08-09 PROCEDURE — 40000170 ZZH STATISTIC PRE-PROCEDURE ASSESSMENT II: Performed by: OTOLARYNGOLOGY

## 2020-08-09 PROCEDURE — 36000056 ZZH SURGERY LEVEL 3 1ST 30 MIN: Performed by: OTOLARYNGOLOGY

## 2020-08-09 PROCEDURE — 36000058 ZZH SURGERY LEVEL 3 EA 15 ADDTL MIN: Performed by: OTOLARYNGOLOGY

## 2020-08-09 PROCEDURE — 0CJS8ZZ INSPECTION OF LARYNX, VIA NATURAL OR ARTIFICIAL OPENING ENDOSCOPIC: ICD-10-PCS | Performed by: OTOLARYNGOLOGY

## 2020-08-09 PROCEDURE — 37000009 ZZH ANESTHESIA TECHNICAL FEE, EACH ADDTL 15 MIN: Performed by: OTOLARYNGOLOGY

## 2020-08-09 PROCEDURE — 86900 BLOOD TYPING SEROLOGIC ABO: CPT | Performed by: EMERGENCY MEDICINE

## 2020-08-09 PROCEDURE — 20000003 ZZH R&B ICU

## 2020-08-09 PROCEDURE — U0003 INFECTIOUS AGENT DETECTION BY NUCLEIC ACID (DNA OR RNA); SEVERE ACUTE RESPIRATORY SYNDROME CORONAVIRUS 2 (SARS-COV-2) (CORONAVIRUS DISEASE [COVID-19]), AMPLIFIED PROBE TECHNIQUE, MAKING USE OF HIGH THROUGHPUT TECHNOLOGIES AS DESCRIBED BY CMS-2020-01-R: HCPCS | Performed by: EMERGENCY MEDICINE

## 2020-08-09 PROCEDURE — 25000125 ZZHC RX 250: Performed by: OTOLARYNGOLOGY

## 2020-08-09 RX ORDER — ONDANSETRON 4 MG/1
4 TABLET, ORALLY DISINTEGRATING ORAL EVERY 6 HOURS PRN
Status: DISCONTINUED | OUTPATIENT
Start: 2020-08-09 | End: 2020-08-25 | Stop reason: HOSPADM

## 2020-08-09 RX ORDER — CLINDAMYCIN PHOSPHATE 900 MG/50ML
INJECTION, SOLUTION INTRAVENOUS PRN
Status: DISCONTINUED | OUTPATIENT
Start: 2020-08-09 | End: 2020-08-09

## 2020-08-09 RX ORDER — NALOXONE HYDROCHLORIDE 0.4 MG/ML
.1-.4 INJECTION, SOLUTION INTRAMUSCULAR; INTRAVENOUS; SUBCUTANEOUS
Status: DISCONTINUED | OUTPATIENT
Start: 2020-08-09 | End: 2020-08-09

## 2020-08-09 RX ORDER — ONDANSETRON 4 MG/1
4 TABLET, ORALLY DISINTEGRATING ORAL EVERY 30 MIN PRN
Status: DISCONTINUED | OUTPATIENT
Start: 2020-08-09 | End: 2020-08-09

## 2020-08-09 RX ORDER — NALOXONE HYDROCHLORIDE 0.4 MG/ML
.1-.4 INJECTION, SOLUTION INTRAMUSCULAR; INTRAVENOUS; SUBCUTANEOUS
Status: DISCONTINUED | OUTPATIENT
Start: 2020-08-09 | End: 2020-08-25 | Stop reason: HOSPADM

## 2020-08-09 RX ORDER — IOPAMIDOL 755 MG/ML
80 INJECTION, SOLUTION INTRAVASCULAR ONCE
Status: DISCONTINUED | OUTPATIENT
Start: 2020-08-09 | End: 2020-08-09

## 2020-08-09 RX ORDER — ONDANSETRON 2 MG/ML
4 INJECTION INTRAMUSCULAR; INTRAVENOUS EVERY 30 MIN PRN
Status: DISCONTINUED | OUTPATIENT
Start: 2020-08-09 | End: 2020-08-09

## 2020-08-09 RX ORDER — HYDROMORPHONE HYDROCHLORIDE 1 MG/ML
.3-.5 INJECTION, SOLUTION INTRAMUSCULAR; INTRAVENOUS; SUBCUTANEOUS EVERY 5 MIN PRN
Status: DISCONTINUED | OUTPATIENT
Start: 2020-08-09 | End: 2020-08-09

## 2020-08-09 RX ORDER — PROPOFOL 10 MG/ML
INJECTION, EMULSION INTRAVENOUS PRN
Status: DISCONTINUED | OUTPATIENT
Start: 2020-08-09 | End: 2020-08-09

## 2020-08-09 RX ORDER — PROPOFOL 10 MG/ML
INJECTION, EMULSION INTRAVENOUS CONTINUOUS PRN
Status: DISCONTINUED | OUTPATIENT
Start: 2020-08-09 | End: 2020-08-09

## 2020-08-09 RX ORDER — HYDROMORPHONE HYDROCHLORIDE 1 MG/ML
.3-.5 INJECTION, SOLUTION INTRAMUSCULAR; INTRAVENOUS; SUBCUTANEOUS
Status: DISCONTINUED | OUTPATIENT
Start: 2020-08-09 | End: 2020-08-12

## 2020-08-09 RX ORDER — DEXAMETHASONE SODIUM PHOSPHATE 4 MG/ML
INJECTION, SOLUTION INTRA-ARTICULAR; INTRALESIONAL; INTRAMUSCULAR; INTRAVENOUS; SOFT TISSUE PRN
Status: DISCONTINUED | OUTPATIENT
Start: 2020-08-09 | End: 2020-08-09

## 2020-08-09 RX ORDER — SODIUM CHLORIDE, SODIUM LACTATE, POTASSIUM CHLORIDE, CALCIUM CHLORIDE 600; 310; 30; 20 MG/100ML; MG/100ML; MG/100ML; MG/100ML
INJECTION, SOLUTION INTRAVENOUS CONTINUOUS
Status: DISCONTINUED | OUTPATIENT
Start: 2020-08-09 | End: 2020-08-09

## 2020-08-09 RX ORDER — FENTANYL CITRATE 50 UG/ML
25-50 INJECTION, SOLUTION INTRAMUSCULAR; INTRAVENOUS EVERY 5 MIN PRN
Status: DISCONTINUED | OUTPATIENT
Start: 2020-08-09 | End: 2020-08-09

## 2020-08-09 RX ORDER — SODIUM CHLORIDE, SODIUM LACTATE, POTASSIUM CHLORIDE, CALCIUM CHLORIDE 600; 310; 30; 20 MG/100ML; MG/100ML; MG/100ML; MG/100ML
INJECTION, SOLUTION INTRAVENOUS CONTINUOUS PRN
Status: DISCONTINUED | OUTPATIENT
Start: 2020-08-09 | End: 2020-08-09

## 2020-08-09 RX ORDER — DEXAMETHASONE SODIUM PHOSPHATE 10 MG/ML
10 INJECTION, SOLUTION INTRAMUSCULAR; INTRAVENOUS ONCE
Status: DISCONTINUED | OUTPATIENT
Start: 2020-08-09 | End: 2020-08-09 | Stop reason: HOSPADM

## 2020-08-09 RX ORDER — LIDOCAINE HCL/EPINEPHRINE/PF 2%-1:200K
VIAL (ML) INJECTION
Status: DISCONTINUED
Start: 2020-08-09 | End: 2020-08-09 | Stop reason: HOSPADM

## 2020-08-09 RX ORDER — CLINDAMYCIN PHOSPHATE 900 MG/50ML
900 INJECTION, SOLUTION INTRAVENOUS
Status: DISCONTINUED | OUTPATIENT
Start: 2020-08-09 | End: 2020-08-09 | Stop reason: HOSPADM

## 2020-08-09 RX ORDER — MEPERIDINE HYDROCHLORIDE 25 MG/ML
12.5 INJECTION INTRAMUSCULAR; INTRAVENOUS; SUBCUTANEOUS EVERY 5 MIN PRN
Status: DISCONTINUED | OUTPATIENT
Start: 2020-08-09 | End: 2020-08-09

## 2020-08-09 RX ORDER — FENTANYL CITRATE 50 UG/ML
INJECTION, SOLUTION INTRAMUSCULAR; INTRAVENOUS PRN
Status: DISCONTINUED | OUTPATIENT
Start: 2020-08-09 | End: 2020-08-09

## 2020-08-09 RX ORDER — ONDANSETRON 2 MG/ML
4 INJECTION INTRAMUSCULAR; INTRAVENOUS EVERY 6 HOURS PRN
Status: DISCONTINUED | OUTPATIENT
Start: 2020-08-09 | End: 2020-08-25 | Stop reason: HOSPADM

## 2020-08-09 RX ORDER — ONDANSETRON 2 MG/ML
INJECTION INTRAMUSCULAR; INTRAVENOUS PRN
Status: DISCONTINUED | OUTPATIENT
Start: 2020-08-09 | End: 2020-08-09

## 2020-08-09 RX ORDER — SODIUM CHLORIDE, SODIUM LACTATE, POTASSIUM CHLORIDE, CALCIUM CHLORIDE 600; 310; 30; 20 MG/100ML; MG/100ML; MG/100ML; MG/100ML
INJECTION, SOLUTION INTRAVENOUS CONTINUOUS
Status: DISCONTINUED | OUTPATIENT
Start: 2020-08-09 | End: 2020-08-09 | Stop reason: HOSPADM

## 2020-08-09 RX ORDER — LIDOCAINE HYDROCHLORIDE AND EPINEPHRINE BITARTRATE 20; .01 MG/ML; MG/ML
INJECTION, SOLUTION SUBCUTANEOUS PRN
Status: DISCONTINUED | OUTPATIENT
Start: 2020-08-09 | End: 2020-08-09 | Stop reason: HOSPADM

## 2020-08-09 RX ORDER — IOPAMIDOL 755 MG/ML
96 INJECTION, SOLUTION INTRAVASCULAR ONCE
Status: DISCONTINUED | OUTPATIENT
Start: 2020-08-09 | End: 2020-08-10

## 2020-08-09 RX ORDER — CLINDAMYCIN PHOSPHATE 900 MG/50ML
900 INJECTION, SOLUTION INTRAVENOUS SEE ADMIN INSTRUCTIONS
Status: DISCONTINUED | OUTPATIENT
Start: 2020-08-09 | End: 2020-08-09 | Stop reason: HOSPADM

## 2020-08-09 RX ORDER — ALBUTEROL SULFATE 0.83 MG/ML
2.5 SOLUTION RESPIRATORY (INHALATION) EVERY 4 HOURS PRN
Status: DISCONTINUED | OUTPATIENT
Start: 2020-08-09 | End: 2020-08-09

## 2020-08-09 RX ADMIN — Medication 8 MCG: at 16:42

## 2020-08-09 RX ADMIN — SODIUM CHLORIDE, POTASSIUM CHLORIDE, SODIUM LACTATE AND CALCIUM CHLORIDE: 600; 310; 30; 20 INJECTION, SOLUTION INTRAVENOUS at 14:27

## 2020-08-09 RX ADMIN — FENTANYL CITRATE 12.5 MCG: 50 INJECTION, SOLUTION INTRAMUSCULAR; INTRAVENOUS at 17:08

## 2020-08-09 RX ADMIN — PROPOFOL 50 MG: 10 INJECTION, EMULSION INTRAVENOUS at 16:53

## 2020-08-09 RX ADMIN — MIDAZOLAM 1 MG: 1 INJECTION INTRAMUSCULAR; INTRAVENOUS at 17:12

## 2020-08-09 RX ADMIN — LIDOCAINE HYDROCHLORIDE 51.8 MG: 20 INJECTION INTRAVENOUS at 18:17

## 2020-08-09 RX ADMIN — Medication 8 MCG: at 17:20

## 2020-08-09 RX ADMIN — MIDAZOLAM 1 MG: 1 INJECTION INTRAMUSCULAR; INTRAVENOUS at 16:26

## 2020-08-09 RX ADMIN — ONDANSETRON 4 MG: 2 INJECTION INTRAMUSCULAR; INTRAVENOUS at 17:21

## 2020-08-09 RX ADMIN — PROPOFOL 50 MG: 10 INJECTION, EMULSION INTRAVENOUS at 17:12

## 2020-08-09 RX ADMIN — HYDROMORPHONE HYDROCHLORIDE 0.5 MG: 1 INJECTION, SOLUTION INTRAMUSCULAR; INTRAVENOUS; SUBCUTANEOUS at 22:49

## 2020-08-09 RX ADMIN — PROPOFOL 25 MCG/KG/MIN: 10 INJECTION, EMULSION INTRAVENOUS at 16:26

## 2020-08-09 RX ADMIN — CLINDAMYCIN PHOSPHATE 900 MG: 900 INJECTION, SOLUTION INTRAVENOUS at 16:27

## 2020-08-09 RX ADMIN — FENTANYL CITRATE 25 MCG: 50 INJECTION, SOLUTION INTRAMUSCULAR; INTRAVENOUS at 16:26

## 2020-08-09 RX ADMIN — PROPOFOL 40 MG: 10 INJECTION, EMULSION INTRAVENOUS at 17:16

## 2020-08-09 RX ADMIN — Medication 8 MCG: at 16:58

## 2020-08-09 RX ADMIN — FENTANYL CITRATE 50 MCG: 50 INJECTION, SOLUTION INTRAMUSCULAR; INTRAVENOUS at 17:21

## 2020-08-09 RX ADMIN — DEXAMETHASONE SODIUM PHOSPHATE 10 MG: 4 INJECTION, SOLUTION INTRA-ARTICULAR; INTRALESIONAL; INTRAMUSCULAR; INTRAVENOUS; SOFT TISSUE at 16:35

## 2020-08-09 RX ADMIN — FENTANYL CITRATE 12.5 MCG: 50 INJECTION, SOLUTION INTRAMUSCULAR; INTRAVENOUS at 16:51

## 2020-08-09 RX ADMIN — HYDROMORPHONE HYDROCHLORIDE 0.3 MG: 1 INJECTION, SOLUTION INTRAMUSCULAR; INTRAVENOUS; SUBCUTANEOUS at 21:07

## 2020-08-09 RX ADMIN — SODIUM CHLORIDE, POTASSIUM CHLORIDE, SODIUM LACTATE AND CALCIUM CHLORIDE: 600; 310; 30; 20 INJECTION, SOLUTION INTRAVENOUS at 16:24

## 2020-08-09 RX ADMIN — Medication 12 MCG: at 17:12

## 2020-08-09 RX ADMIN — MIDAZOLAM HYDROCHLORIDE 0.5 MG: 1 INJECTION, SOLUTION INTRAMUSCULAR; INTRAVENOUS at 21:37

## 2020-08-09 ASSESSMENT — ACTIVITIES OF DAILY LIVING (ADL)
TOILETING: 0-->INDEPENDENT
ADLS_ACUITY_SCORE: 13
AMBULATION: 0-->INDEPENDENT
FALL_HISTORY_WITHIN_LAST_SIX_MONTHS: NO
WHICH_OF_THE_ABOVE_FUNCTIONAL_RISKS_HAD_A_RECENT_ONSET_OR_CHANGE?: SWALLOWING
RETIRED_EATING: 0-->INDEPENDENT
DRESS: 0-->INDEPENDENT
BATHING: 0-->INDEPENDENT
SWALLOWING: 0-->SWALLOWS FOODS/LIQUIDS WITHOUT DIFFICULTY
TRANSFERRING: 0-->INDEPENDENT
RETIRED_COMMUNICATION: 0-->UNDERSTANDS/COMMUNICATES WITHOUT DIFFICULTY
COGNITION: 0 - NO COGNITION ISSUES REPORTED

## 2020-08-09 ASSESSMENT — ENCOUNTER SYMPTOMS
FEVER: 0
VOMITING: 0
DYSRHYTHMIAS: 0
SORE THROAT: 0
DIFFICULTY URINATING: 0
SHORTNESS OF BREATH: 1
DIARRHEA: 0

## 2020-08-09 ASSESSMENT — MIFFLIN-ST. JEOR
SCORE: 1002.13
SCORE: 1028.45

## 2020-08-09 NOTE — BRIEF OP NOTE
[facility]    Brief Operative Note    Pre-operative diagnosis: Airway obstruction [J98.8]  Post-operative diagnosis laryngeal cancer, airway obstruction    Procedure: Procedure(s):  DIRECT LARYNGOSCOPY  AWAKE TRACHEOSTOMY  Surgeon: Surgeon(s) and Role:     * Magdaleno Meza MD - Primary  Anesthesia: General   Estimated blood loss: Less than 10 ml  Drains: None  Specimens: * No specimens in log *  Findings:   Diffuse edema of larynx, laryngeal cancer involving glottis. Size 6 cuffed Shiley tube used.  Complications: None.  Implants: * No implants in log *      Magdaleno Meza M.D.

## 2020-08-09 NOTE — PROGRESS NOTES
Patient arrived to ICU. Put on hi-rohith through trach, 30%/30LPM. IV lidocaine given for persistent coughing. VSS. Denies pain. Family contacted by

## 2020-08-09 NOTE — PROGRESS NOTES
ENT CONSULT    History      Requesting: Dr. Becerra    Chief Complaint:     Shortness of Breath (radiation for throat cancer)     CARLY Crespo is a 70 year old female with a diagnosis of throat cancer her made earlier this year.  She recently underwent biopsy on 710 by Dr. 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  Her glottic opening is narrow and Dr. Rosario had recommended awake tracheostomy but the patient deferred.  The patient has had a hoarse voice ever since April or May of this year after undergoing laryngoscopy. Today with increasing shortness of breath and difficulty swallowing secretions since Thursday with worsening symptoms last night.  She denies any fever, new cough, vomiting, diarrhea.  She can still taste and smell.  She started radiation last week and has had 5 treatments.  At this time she presented to the emergency department requesting that the trach be performed due to her increasing symptoms. She is known to have an anterior airway and has required Glidescope for intubations in the past.     Allergies:  Penicillins  Sulfa Drugs      Medications:    aspirin 81 MG EC tablet  CALCIUM 600 MG OR TABS  famotidine (PEPCID) 20 MG tablet  sucralfate (CARAFATE) 1 GM tablet  UNABLE TO FIND  UNABLE TO FIND  VITAMIN D, CHOLECALCIFEROL, PO          Past Medical History:         Past Medical History:   Diagnosis Date     HTN, goal below 140/90       HYPERLIPIDEMIA NEC/NOS 9/7/2004     OSTEOPENIA 4/30/2008   Squamous cell carcinoma of the throat.           Patient Active Problem List     Diagnosis Date Noted     Vitamin D deficiency 09/29/2014       Priority: Medium       Problem list name updated by automated process. Provider to review        HTN (Hypertension); goal < 140/90 02/25/2009       Priority: Medium     Disorder of bone and cartilage 04/30/2008       Priority: Medium       Problem list name updated by automated process. Provider to review        Hyperlipidemia  "09/07/2004       Priority: Medium       Problem list name updated by automated process. Provider to review            Past Surgical History:    Past Surgical History         Past Surgical History:   Procedure Laterality Date     CHOLECYSTECTOMY, OPEN         HYSTERECTOMY, PAP NO LONGER INDICATED         For dysfunctional uterine bleeding     LARYNGOSCOPY WITH BIOPSY(IES) N/A 7/10/2020     Procedure: Microscopic direct laryngoscopy with biopsies;  Surgeon: Hudson Rosario MD;  Location:  OR     LARYNGOSCOPY WITH MICROSCOPE N/A 1/31/2020     Procedure: MICRO DIRECT LARYNGOSCOPY with LASER;  Surgeon: Hudson Rosario MD;  Location: [facility] OR     LARYNGOSCOPY, EXCISE VOCAL CORD LESION, COMBINED N/A 1/31/2020     Procedure: EXCISION OF VOCAL CORD MASS;  Surgeon: Hudson Rosario MD;  Location:  OR            Family History:    family history includes C.A.D. in her mother; Cancer in her father.     Social History:   reports that she has never smoked. She has never used smokeless tobacco. She reports that she does not drink alcohol or use drugs.     PCP: Gustavo Crespo      Review of Systems    Constitutional: Negative for fever.   HENT: Negative for sore throat.    Respiratory: Positive for shortness of breath.    Cardiovascular: Negative for chest pain.   Gastrointestinal: Negative for diarrhea and vomiting.   Genitourinary: Negative for difficulty urinating.   Skin: Negative for rash.   All other systems reviewed and are negative.    PHYSICAL EXAMINATION  /75   Temp 98.4  F (36.9  C) (Oral)   Resp 18   Ht 1.6 m (5' 3\")   Wt 54.4 kg (120 lb)   SpO2 96%   BMI 21.26 kg/m    General appearance: Well developed, well nourished and groomed. No apparent acute or chronic distress.   Ability to communicate: normal. Severe hoarseness, mild stridor to deep inhalation.  Moving air well, no distress.  HEAD, FACE, SALIVARY GLANDS AND TMJ:   Inspection of Head and Face: Normal contour and symmetry. No masses, " lesions, or significant scars observed.   Palpation/Percussion of the Face: No tenderness, deformity or instability.   Palpation of Parotid and Submaxillary glands: Normal.   Facial Mobility: Normal.   EYES: Ocular Motility: gaze appears conjugate in all positions; no evident nystagmus.   EAR, NOSE, MOUTH AND THROAT:   Pinnas and External Nose: Normal.   Hearing: Conversational speech rarely or never misunderstands words.   Nasal Interior:   Septum - Midline.   Turbinates and middle meatus - Normal.   Rhinorrhea - None.   Vestibular skin - Normal bilaterally.   Mucosa - Normal.   Lips, Teeth and Gums: Normal. Adequate salivary pool.  Oral Cavity and Oropharynx: Small mouth, small jaw, Mallampati 3  NECK AND THYROID:   Neck: normal symmetry; trachea midline; normal laryngeal crepitation; no adenopathy; no neck masses; no skin lesions; no scars.   Thyroid: normal size; no masses or tenderness.   RESPIRATORY: Chest Wall expands normally and no deformities noted. Respiratory Effort normal.   CARDIOVASCULAR:   Symmetric carotid pulses regular rate.  Peripheral Vascular System: normal pulses with no peripheral vascular swelling or varicosities, tenderness or edema. Skin warm and dry.   LYMPH NODES: Neck Nodes: normal, no adenopathy.   NEUROLOGIC:   Level of orientation: normal to time, place, person and situation.   Cranial nerves: Cranial nerves II-XII grossly intact and symmetrical.   PSYCHIATRIC:   Level of consciousness: awake and alert.   Judgment and insight: normal.   Mood and affect: normal and appropriate to the situation.     FLEXIBLE FIBEROPTIC LARYNGOSCOPY  Nasal cavities: patent and normal.    Nasopharynx examination: Normal   Base of tongue, Pharyngeal walls, Vallecula and Pyriform Sinuses: Normal  Larynx: edema of false cords and vocal cords which are bilaterally irregular and with limited opening bilaterally.  Narrow glottic aperture.  No exophytic masses.   Subglottis and trachea: Normal, minimal view due  to narrowing      IMPRESSION: [id number] laryngeal cancer with edema of airway and partial airway obstruction.  She is going to be on a long course of radiation and expect the airway compromise to worsen with additional radiation treatments.  Current air way is not emergently obstructed.    RECOMMENDATION: to OR for awake tracheostomy.  I discussed timing with her and I think the best option is to proceed today to the OR which is the safest option.  Will plan on DL and awake tracheostomy today.  Risks and benefits discussed in detail with her and her daughter. Explained need for hospitalization for several days postop.  ICU overnight.  She has severe dysphagia also, and should have PEG placement by IR during hospitalization.    Magdaleno Meza M.D.  P:[phone]

## 2020-08-09 NOTE — ED TRIAGE NOTES
Pt reports Shortness of breath worsening in last few days. Pt reports needs tracheostomy for throat cancer

## 2020-08-09 NOTE — ED PROVIDER NOTES
History     Chief Complaint:    Shortness of Breath (radiation for throat cancer)       CARLY Crespo is a 70 year old female with a diagnosis of throat cancer her made earlier this year.  She recently underwent biopsy on 710 by Dr. 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  Apparently at that time they had recommended a tracheostomy but the patient deferred.  The patient has had a hoarse voice ever since April or May of this year after undergoing laryngoscopic he.  However she comes in today with increasing shortness of breath and difficulty swallowing secretions since Thursday with worsening symptoms last night.  She denies any fever, new cough, vomiting, diarrhea.  She can still taste and smell.  She started radiation last week and has had 5 treatments.  At this time she presented to the emergency department requesting that the trach be performed due to her increasing symptoms.    Allergies:  Penicillins  Sulfa Drugs     Medications:    No current outpatient medications on file.      Past Medical History:    Past Medical History:   Diagnosis Date     HTN, goal below 140/90      HYPERLIPIDEMIA NEC/NOS 9/7/2004     OSTEOPENIA 4/30/2008   Squamous cell carcinoma of the throat.    Patient Active Problem List    Diagnosis Date Noted     Vitamin D deficiency 09/29/2014     Priority: Medium     Problem list name updated by automated process. Provider to review       HTN (Hypertension); goal < 140/90 02/25/2009     Priority: Medium     Disorder of bone and cartilage 04/30/2008     Priority: Medium     Problem list name updated by automated process. Provider to review       Hyperlipidemia 09/07/2004     Priority: Medium     Problem list name updated by automated process. Provider to review          Past Surgical History:    Past Surgical History:   Procedure Laterality Date     CHOLECYSTECTOMY, OPEN       HYSTERECTOMY, PAP NO LONGER INDICATED      For dysfunctional uterine bleeding      "LARYNGOSCOPY WITH BIOPSY(IES) N/A 7/10/2020    Procedure: Microscopic direct laryngoscopy with biopsies;  Surgeon: Hudson Rosario MD;  Location:  OR     LARYNGOSCOPY WITH MICROSCOPE N/A 1/31/2020    Procedure: MICRO DIRECT LARYNGOSCOPY with LASER;  Surgeon: Hudson Rosario MD;  Location:  OR     LARYNGOSCOPY, EXCISE VOCAL CORD LESION, COMBINED N/A 1/31/2020    Procedure: EXCISION OF VOCAL CORD MASS;  Surgeon: Hudson Rosario MD;  Location:  OR        Family History:    family history includes C.A.D. in her mother; Cancer in her father.    Social History:   reports that she has never smoked. She has never used smokeless tobacco. She reports that she does not drink alcohol or use drugs.    PCP: Gustavo Crespo     Review of Systems   Constitutional: Negative for fever.   HENT: Negative for sore throat.    Respiratory: Positive for shortness of breath.    Cardiovascular: Negative for chest pain.   Gastrointestinal: Negative for diarrhea and vomiting.   Genitourinary: Negative for difficulty urinating.   Skin: Negative for rash.   All other systems reviewed and are negative.        Physical Exam     Patient Vitals for the past 24 hrs:   BP Temp Temp src Pulse Heart Rate Resp SpO2 Height Weight   08/09/20 1419 (!) 156/80 -- -- -- -- -- -- -- --   08/09/20 1417 -- 96.9  F (36.1  C) Temporal -- -- -- 96 % -- --   08/09/20 1204 133/75 98.4  F (36.9  C) Oral -- 108 18 96 % 1.6 m (5' 3\") 54.4 kg (120 lb)   08/09/20 1203 -- -- -- -- -- -- -- -- 72.6 kg (160 lb)        Physical Exam    Physical Exam   Constitutional:  Patient is oriented to person, place, and time. They appear well-developed and well-nourished.  HENT:   Mouth/Throat:   Oropharynx is clear and moist. Very hoarse voice  Eyes:    Conjunctivae normal and EOM are normal. Pupils are equal, round, and reactive to light.   Neck:    Normal range of motion. No palpable masses.  Is able to swallow secretions  Cardiovascular: Normal rate, regular rhythm " and normal heart sounds.  Exam reveals no gallop and no friction rub.  No murmur heard.  Pulmonary/Chest:  Effort normal stridorous breath sounds speaks in full sentences.. Patient has trace wheezes. Patient has no rales.   Abdominal:   Soft. Bowel sounds are normal. Patient exhibits no mass. There is no tenderness. There is no rebound and no guarding.   Musculoskeletal:  Normal range of motion. Patient exhibits no edema.   Neurological:   Patient is alert and oriented to person, place, and time. Patient has normal strength. No cranial nerve deficit or sensory deficit. GCS 15  Skin:   Skin is warm and dry. No rash noted. No erythema.   Psychiatric:   Patient has a normal mood and affect. Patient's behavior is normal. Judgment and thought content normal.       Emergency Department Course     Laboratory:    Labs Ordered and Resulted from Time of ED Arrival Up to the Time of Departure from the ED   CBC WITH PLATELETS DIFFERENTIAL - Abnormal; Notable for the following components:       Result Value    WBC 3.1 (*)     Absolute Lymphocytes 0.7 (*)     All other components within normal limits   BASIC METABOLIC PANEL - Abnormal; Notable for the following components:    Glucose 129 (*)     All other components within normal limits   INR   PARTIAL THROMBOPLASTIN TIME   COVID-19 VIRUS (CORONAVIRUS) BY PCR   PERIPHERAL IV CATHETER   ABO/RH TYPE AND SCREEN            Interventions:  Peripheral IV was started.  Patient placed on pulse oximeter.  Medications   iopamidol (ISOVUE-370) solution 96 mL ( Intravenous Auto Hold 8/9/20 1601)   Saline flush ( Intravenous Auto Hold 8/9/20 1601)        Emergency Department Course:  Past medical records, nursing notes, and vitals reviewed.  1230 I performed an exam of the patient and obtained history, as documented above.  1238 I spoke with Dr. Meza of ENT regarding this patient.  He will, over from his office to assess the patient.  I rechecked the patient. Findings and plan explained to  the Patient Dr. Meza scope the patient and he did so she is some moderate edema but the airway was otherwise patent.  At this time he is recommending transfer to the OR for tracheostomy.  I discussed with him that I had ordered imaging of soft tissue neck, however at this time he does not feel that this is necessary.  This test was canceled..    Impression & Plan      Medical Decision Making:  Cherrie Crespo is a 70-year-old female presenting to the emergency department with increasing difficulty swallowing her secretions and feeling mildly short of breath.  She otherwise does not endorse any cough, fever, COVID symptoms, chest pain.  I was able to review her chart and it sounds like there was a lot of friable tissue around the vocal cords at the time of biopsy about a month ago.  I did speak with ENT immediately about this patient and Dr. Meza did come over to assess the patient.  In the meantime basic blood work was obtained.  I did write for soft tissue neck imaging however Dr. Meza was able to scope the patient in the emergency room and did not feel that CT would add any additional value and at this time he is recommending doing doing a trach in the OR.  The patient will likely be vented after the trach today so I did speak with Dr. Juarez, intensivist, and the ICU as the patient will be going to ICU.      Diagnosis:    ICD-10-CM    1. Throat cancer (H)  C14.0 ABO/Rh type and screen        Transfer to the OR, with admission bed to ICU.    8/9/2020   Mary Becerra MD Bochert, Michelle Ann, MD  08/09/20 1601

## 2020-08-09 NOTE — H&P
[facility]    History and Physical  [facility] Intensive Care     Date of Admission:  8/9/2020    Assessment & Plan   Cherrie Crespo is a 70 year old female who presents with airway obstruction requiring tracheostomy. Per ED notes:    Cherrie Crespo is a 70 year old female with a diagnosis of throat cancer her made earlier this year.  She recently underwent biopsy on 710 by Dr. 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  Apparently at that time they had recommended a tracheostomy but the patient deferred.  The patient has had a hoarse voice ever since April or May of this year after undergoing laryngoscopic he.  However she comes in today with increasing shortness of breath and difficulty swallowing secretions since Thursday with worsening symptoms last night.  She denies any fever, new cough, vomiting, diarrhea.  She can still taste and smell.  She started radiation last week and has had 5 treatments.  At this time she presented to the emergency department requesting that the trach be performed due to her increasing symptoms.    Neurology:  No issues: Patient awakening and cooperative upon return from the operating room.      Cardiovascular:  Hemodynamically stable: Monitor per unit protocol  -    Pulmonary:        On trach dome for humidified oxygen: Ventilator support if needed overnight.  Leave cuff up on tracheostomy tube overnight  -    Renal  No issues: Avoid nephrotoxic agents.  -    ID:         No issues: Perioperative antibiotics per ENT instructions  -    GI  No issues  -    Nutrition  N.p.o. overnight  -    Endocrine:  No issues: Glycemic control per unit protocol  -    Heme/Onc:  Squamous cell carcinoma of the throat: Currently undergoing radiation therapy  -    DVT Prophylaxis: Pneumatic Compression Devices  GI Prophylaxis: Not indicated    Restraints: Restraints for medical healing needed: NO    Family update by me today: No    Colton Dubose  Ayse    Time Spent on this Encounter   Billing:  I spent 35 minutes bedside and on the inpatient unit today managing the critical care of Cherrie Crespo in relation to the issues listed in this note.    Code Status   Full Code    Primary Care Physician   Gustavo Crespo    Chief Complaint   Increasing stridor and dyspnea    History is obtained from the electronic health record and emergency department physician    History of Present Illness   Cherrie Crespo is a 70 year old female who presents with airway obstruction due to edema following radiation therapy for squamous cell carcinoma of the throat.    Past Medical History    I have reviewed this patient's medical history and updated it with pertinent information if needed.   Past Medical History:   Diagnosis Date     HTN, goal below 140/90      HYPERLIPIDEMIA NEC/NOS 9/7/2004     OSTEOPENIA 4/30/2008       Past Surgical History   I have reviewed this patient's surgical history and updated it with pertinent information if needed.  Past Surgical History:   Procedure Laterality Date     CHOLECYSTECTOMY, OPEN       HYSTERECTOMY, PAP NO LONGER INDICATED      For dysfunctional uterine bleeding     LARYNGOSCOPY WITH BIOPSY(IES) N/A 7/10/2020    Procedure: Microscopic direct laryngoscopy with biopsies;  Surgeon: Hudson Rosario MD;  Location:  OR     LARYNGOSCOPY WITH MICROSCOPE N/A 1/31/2020    Procedure: MICRO DIRECT LARYNGOSCOPY with LASER;  Surgeon: Hudson Rosario MD;  Location:  OR     LARYNGOSCOPY, EXCISE VOCAL CORD LESION, COMBINED N/A 1/31/2020    Procedure: EXCISION OF VOCAL CORD MASS;  Surgeon: Hudson Rosario MD;  Location:  OR       Prior to Admission Medications   None     Allergies   Allergies   Allergen Reactions     Penicillins Shortness Of Breath and Rash     Sulfa Drugs Shortness Of Breath and Rash       Social History   I have reviewed this patient's social history and updated it with pertinent information if needed. Cherrie Crespo   reports that she has never smoked. She has never used smokeless tobacco. She reports that she does not drink alcohol or use drugs.    Family History   I have reviewed this patient's family history and updated it with pertinent information if needed.   Family History   Problem Relation Age of Onset     C.A.D. Mother         By PAss     Cancer Father         Lung Cancer     Allergies No family hx of        Review of Systems   Review of systems not obtained due to patient factors - critical condition and intubation    Physical Exam   Temp: 97.3  F (36.3  C) Temp src: Axillary Temp  Min: 96.9  F (36.1  C)  Max: 98.4  F (36.9  C) BP: (!) 140/87 Pulse: 82 Heart Rate: 82 Resp: 13 SpO2: 97 % O2 Device: High Flow Nasal Cannula (HFNC) Oxygen Delivery: 30 LPM  Vital Signs with Ranges  Temp:  [96.9  F (36.1  C)-98.4  F (36.9  C)] 97.3  F (36.3  C)  Pulse:  [82] 82  Heart Rate:  [] 82  Resp:  [13-18] 13  BP: (133-156)/(75-87) 140/87  FiO2 (%):  [30 %] 30 %  SpO2:  [96 %-97 %] 97 %  114 lbs 3.17 oz    Constitutional: Well-developed well-nourished female awakening from general anesthesia  Eyes: PERRL  ENT: Tracheostomy in place, no active bleeding, dressing dry and intact  Respiratory: Lungs clear  Cardiovascular: Regular rate and rhythm with no murmur  GI: Soft, nondistended  Skin: Warm well perfused throughout  Neurologic: Grossly intact  Neuropsychiatric: Calm, interactive    Data   Results for orders placed or performed during the hospital encounter of 08/09/20 (from the past 24 hour(s))   CBC with platelets differential   Result Value Ref Range    WBC 3.1 (L) 4.0 - 11.0 10e9/L    RBC Count 4.52 3.8 - 5.2 10e12/L    Hemoglobin 13.2 11.7 - 15.7 g/dL    Hematocrit 39.8 35.0 - 47.0 %    MCV 88 78 - 100 fl    MCH 29.2 26.5 - 33.0 pg    MCHC 33.2 31.5 - 36.5 g/dL    RDW 12.3 10.0 - 15.0 %    Platelet Count 221 150 - 450 10e9/L    Diff Method Automated Method     % Neutrophils 62.2 %    % Lymphocytes 21.0 %    % Monocytes 12.6  %    % Eosinophils 2.9 %    % Basophils 1.0 %    % Immature Granulocytes 0.3 %    Nucleated RBCs 0 0 /100    Absolute Neutrophil 1.9 1.6 - 8.3 10e9/L    Absolute Lymphocytes 0.7 (L) 0.8 - 5.3 10e9/L    Absolute Monocytes 0.4 0.0 - 1.3 10e9/L    Absolute Eosinophils 0.1 0.0 - 0.7 10e9/L    Absolute Basophils 0.0 0.0 - 0.2 10e9/L    Abs Immature Granulocytes 0.0 0 - 0.4 10e9/L    Absolute Nucleated RBC 0.0    INR   Result Value Ref Range    INR 1.00 0.86 - 1.14   Partial thromboplastin time   Result Value Ref Range    PTT 32 22 - 37 sec   ABO/Rh type and screen   Result Value Ref Range    ABO B     RH(D) Pos     Antibody Screen Neg     Test Valid Only At [facility]        Specimen Expires 08/12/2020    Basic metabolic panel   Result Value Ref Range    Sodium 137 133 - 144 mmol/L    Potassium 3.9 3.4 - 5.3 mmol/L    Chloride 106 94 - 109 mmol/L    Carbon Dioxide 28 20 - 32 mmol/L    Anion Gap 3 3 - 14 mmol/L    Glucose 129 (H) 70 - 99 mg/dL    Urea Nitrogen 11 7 - 30 mg/dL    Creatinine 0.92 0.52 - 1.04 mg/dL    GFR Estimate 63 >60 mL/min/[1.73_m2]    GFR Estimate If Black 73 >60 mL/min/[1.73_m2]    Calcium 8.9 8.5 - 10.1 mg/dL   Asymptomatic COVID-19 Virus (Coronavirus) by PCR    Specimen: Nasopharyngeal   Result Value Ref Range    COVID-19 Virus PCR to [facility] - Source Nasopharyngeal     COVID-19 Virus PCR to [facility] - Result       Test received-See reflex to IDDL test SARS CoV2 (COVID-19) Virus RT-PCR   SARS-CoV-2 COVID-19 Virus (Coronavirus) RT-PCR Nasopharyngeal    Specimen: Nasopharyngeal   Result Value Ref Range    SARS-CoV-2 Virus Specimen Source Nasopharyngeal     SARS-CoV-2 PCR Result NEGATIVE     SARS-CoV-2 PCR Comment       Testing was performed using the Xpert Xpress SARS-CoV-2 Assay on the Cepheid Gene-Xpert   Instrument Systems. Additional information about this Emergency Use Authorization (EUA)   assay can be found via the Lab Guide.     Glucose by meter   Result Value Ref Range     Glucose 144 (H) 70 - 99 mg/dL     Colton Tavera MD  [facility] Intensivist Service

## 2020-08-09 NOTE — ANESTHESIA CARE TRANSFER NOTE
Patient: Cherrie Crespo    Procedure(s):  DIRECT LARYNGOSCOPY  AWAKE TRACHEOSTOMY    Diagnosis: Airway obstruction [J98.8]  Diagnosis Additional Information: No value filed.    Anesthesia Type:   General     Note:  Airway :Blow-by  Patient transferred to:ICU  Comments:     Transferred to PACU RN. Patient awake and verbal. Spontaneous resp and on room air. Monitors and alarms on. VSS. Report given.      Vitals: (Last set prior to Anesthesia Care Transfer)    CRNA VITALS  8/9/2020 1704 - 8/9/2020 1804      8/9/2020             Resp Rate (observed):  (!) 34                Electronically Signed By: VINITA Pretty CRNA  August 9, 2020  6:12 PM

## 2020-08-09 NOTE — ANESTHESIA PREPROCEDURE EVALUATION
Anesthesia Pre-Procedure Evaluation    Patient: Cherrie Crespo   MRN: [id number] : 1950          Preoperative Diagnosis: Airway obstruction [J98.8]    Procedure(s):  DIRECT LARYNGOSCOPY  AWAKE TRACHEOSTOMY    Past Medical History:   Diagnosis Date     HTN, goal below 140/90      HYPERLIPIDEMIA NEC/NOS 2004     OSTEOPENIA 2008     Past Surgical History:   Procedure Laterality Date     CHOLECYSTECTOMY, OPEN       HYSTERECTOMY, PAP NO LONGER INDICATED      For dysfunctional uterine bleeding     LARYNGOSCOPY WITH BIOPSY(IES) N/A 7/10/2020    Procedure: Microscopic direct laryngoscopy with biopsies;  Surgeon: Hudson Rosario MD;  Location:  OR     LARYNGOSCOPY WITH MICROSCOPE N/A 2020    Procedure: MICRO DIRECT LARYNGOSCOPY with LASER;  Surgeon: Hudson Rosario MD;  Location: [facility] OR     LARYNGOSCOPY, EXCISE VOCAL CORD LESION, COMBINED N/A 2020    Procedure: EXCISION OF VOCAL CORD MASS;  Surgeon: Hudson Rosario MD;  Location:  OR       Anesthesia Evaluation     . Pt has had prior anesthetic. Type: General (Glidescope, 6.0 ETT)    History of anesthetic complications   - difficult intubation        ROS/MED HX    ENT/Pulmonary: Comment: Squamous cell CA of vocal cords s/p radiation therapy      Neurologic:  - neg neurologic ROS     Cardiovascular:     (+) Dyslipidemia, hypertension----. : . . . :. .      (-) CAD and arrhythmias   METS/Exercise Tolerance:     Hematologic:         Musculoskeletal:         GI/Hepatic:  - neg GI/hepatic ROS       Renal/Genitourinary:  - ROS Renal section negative       Endo:  - neg endo ROS       Psychiatric:         Infectious Disease:         Malignancy:         Other:                          Physical Exam  Normal systems: dental    Airway   Mallampati: III  TM distance: >3 FB  Neck ROM: full  Comment: Small mouth  Very hoarse voice    Dental     Cardiovascular   Rhythm and rate: regular      Pulmonary    breath sounds clear to  "auscultation            Lab Results   Component Value Date    WBC 3.1 (L) 08/09/2020    HGB 13.2 08/09/2020    HCT 39.8 08/09/2020     08/09/2020     08/09/2020    POTASSIUM 3.9 08/09/2020    CHLORIDE 106 08/09/2020    CO2 28 08/09/2020    BUN 11 08/09/2020    CR 0.92 08/09/2020     (H) 08/09/2020    LOKESH 8.9 08/09/2020    ALBUMIN 3.5 01/20/2020    PROTTOTAL 7.0 01/20/2020    ALT 29 01/20/2020    AST 22 01/20/2020    ALKPHOS 89 01/20/2020    BILITOTAL 0.3 01/20/2020    PTT 32 08/09/2020    INR 1.00 08/09/2020    TSH 1.10 09/23/2014       Preop Vitals  BP Readings from Last 3 Encounters:   08/09/20 133/75   07/10/20 124/64   05/27/20 (!) 186/107    Pulse Readings from Last 3 Encounters:   07/10/20 82   05/27/20 79   01/31/20 63      Resp Readings from Last 3 Encounters:   08/09/20 18   07/10/20 14   05/27/20 22    SpO2 Readings from Last 3 Encounters:   08/09/20 96%   07/10/20 96%   05/27/20 100%      Temp Readings from Last 1 Encounters:   08/09/20 36.9  C (98.4  F) (Oral)    Ht Readings from Last 1 Encounters:   08/09/20 1.6 m (5' 3\")      Wt Readings from Last 1 Encounters:   08/09/20 54.4 kg (120 lb)    Estimated body mass index is 21.26 kg/m  as calculated from the following:    Height as of this encounter: 1.6 m (5' 3\").    Weight as of this encounter: 54.4 kg (120 lb).       Anesthesia Plan      History & Physical Review  History and physical reviewed and following examination; no interval change.    ASA Status:  3 .        Plan for General with Inhalation induction. Maintenance will be Balanced.    PONV prophylaxis:  Ondansetron (or other 5HT-3)  Additional equipment: Difficult Airway Cart and Videolaryngoscope Difficult airway cart  Videolaryngoscope        Postoperative Care  Postoperative pain management:  Multi-modal analgesia.      Consents  Anesthetic plan, risks, benefits and alternatives discussed with:  Patient..                 Juan Francisco Winchester MD  "

## 2020-08-09 NOTE — ANESTHESIA POSTPROCEDURE EVALUATION
Patient: Cherrie Crespo    Procedure(s):  DIRECT LARYNGOSCOPY  AWAKE TRACHEOSTOMY    Diagnosis:Airway obstruction [J98.8]  Diagnosis Additional Information: No value filed.    Anesthesia Type:  MAC    Note:  Anesthesia Post Evaluation    Patient location during evaluation: ICU  Patient participation: Able to participate in evaluation but full recovery from regional anesthesia has not yet ocurrred but is anticipated to occur within 48 hours  Level of consciousness: sleepy but conscious  Airway patency: patent (Tracheostomy in place)  Cardiovascular status: stable  Respiratory status: ventilator  Hydration status: acceptable       Comments: Transported to ICU in stable condition.          Last vitals:  Vitals:    08/09/20 1204 08/09/20 1417 08/09/20 1419   BP: 133/75  (!) 156/80   Resp: 18     Temp: 36.9  C (98.4  F) 36.1  C (96.9  F)    SpO2: 96% 96%          Electronically Signed By: Juan Francisco Winchester MD  August 9, 2020  5:54 PM

## 2020-08-10 LAB
GLUCOSE BLDC GLUCOMTR-MCNC: 122 MG/DL (ref 70–99)
GLUCOSE BLDC GLUCOMTR-MCNC: 133 MG/DL (ref 70–99)
GLUCOSE BLDC GLUCOMTR-MCNC: 78 MG/DL (ref 70–99)

## 2020-08-10 PROCEDURE — 40000275 ZZH STATISTIC RCP TIME EA 10 MIN

## 2020-08-10 PROCEDURE — 99232 SBSQ HOSP IP/OBS MODERATE 35: CPT | Performed by: HOSPITALIST

## 2020-08-10 PROCEDURE — 00000146 ZZHCL STATISTIC GLUCOSE BY METER IP

## 2020-08-10 PROCEDURE — 12000000 ZZH R&B MED SURG/OB

## 2020-08-10 PROCEDURE — 25800030 ZZH RX IP 258 OP 636: Performed by: HOSPITALIST

## 2020-08-10 RX ORDER — DEXTROSE MONOHYDRATE, SODIUM CHLORIDE, AND POTASSIUM CHLORIDE 50; 1.49; 4.5 G/1000ML; G/1000ML; G/1000ML
INJECTION, SOLUTION INTRAVENOUS CONTINUOUS
Status: DISCONTINUED | OUTPATIENT
Start: 2020-08-10 | End: 2020-08-11

## 2020-08-10 RX ADMIN — POTASSIUM CHLORIDE, DEXTROSE MONOHYDRATE AND SODIUM CHLORIDE: 150; 5; 450 INJECTION, SOLUTION INTRAVENOUS at 16:29

## 2020-08-10 ASSESSMENT — ACTIVITIES OF DAILY LIVING (ADL)
ADLS_ACUITY_SCORE: 14
ADLS_ACUITY_SCORE: 13
ADLS_ACUITY_SCORE: 14
ADLS_ACUITY_SCORE: 13
ADLS_ACUITY_SCORE: 14
ADLS_ACUITY_SCORE: 13

## 2020-08-10 ASSESSMENT — MIFFLIN-ST. JEOR: SCORE: 1002.13

## 2020-08-10 NOTE — PLAN OF CARE
SLP - Epic notes were reviewed, and RN was consulted.  Recommend [place] Valve (PMV) trial with cuff deflation prior to swallow evaluation trials.  PMV use and cuff deflation assists with swallow function.   Note ENT plans to attempt cuff deflation 8/11.  If cleared for cuff deflation per ENT, will attempt PMV placement 8/11.  MD-please enter orders for [place] Valve Evaluation with cuff deflation when felt indicated.  (Best practice for PMV trial is also 48-72 hours after trach placement.)    Suspect a video swallow study will be indicated 1-3 days after PMV use to fully assess silent aspiration risk given high risk for silent aspiration for patient's with trach placement.  Pt also has risk factors for increased dysphagia with ongoing radiation.  ENT note mentioned PEG placement; however, no PEG present per RN.  Recommend RN/MD discussion of PEG placement knowing pt's high risk factors for aspiration/increased dysphagia as radiation Tx's continue.  SLP to proceed with evaluations 8/11 as indicated.

## 2020-08-10 NOTE — UTILIZATION REVIEW
"  Admission Status; Secondary Review Determination         Under the authority of the Utilization Management Committee, the utilization review process indicated a secondary review on the above patient.  The review outcome is based on review of the medical records, discussions with staff, and applying clinical experience noted on the date of the review.        (x)      Inpatient Status Appropriate - This patient's medical care is consistent with medical management for inpatient care and reasonable inpatient medical practice.      () Observation Status Appropriate - This patient does not meet hospital inpatient criteria and is placed in observation status. If this patient's primary payer is Medicare and was admitted as an inpatient, Condition Code 44 should be used and patient status changed to \"observation\".   () Admission Status NOT Appropriate - This patient's medical care is not consistent with medical management for Inpatient or Observation Status.          RATIONALE FOR DETERMINATION   Cherrie Crespo is a 70-year-old female with known history of squamous cell carcinoma of the larynx, undergoing external beam radiation therapy and has received 5 treatments.  She has a narrow glottis and has been having worsening difficulty breathing with mild stridor since starting radiation.  Tracheostomy was previously advised and initially she deferred but with worsening symptoms, presented for the procedure.  She underwent awake tracheostomy and was admitted following the procedure for close monitoring.  It is anticipated that she will require at least a 2 midnight stay.  IP status appropriate.    The severity of illness, intensity of service provided, expected LOS and risk for adverse outcome make the care complex, high risk and appropriate for hospital admission.        The information on this document is developed by the utilization review team in order for the business office to ensure compliance.  This only denotes the " appropriateness of proper admission status and does not reflect the quality of care rendered.         The definitions of Inpatient Status and Observation Status used in making the determination above are those provided in the CMS Coverage Manual, Chapter 1 and Chapter 6, section 70.4.      Sincerely,     Bailee Cha MD  Physician Advisor  Utilization Review/ Case Management  [facility].

## 2020-08-10 NOTE — PHARMACY-ADMISSION MEDICATION HISTORY
Pharmacy Medication History  Admission medication history interview status for the 8/9/2020  admission is complete. See EPIC admission navigator for prior to admission medications     Medication history sources: Patient  Medication history source reliability: Good  Adherence assessment: Unable to assess    Additional medication history information:   -She reports taking no medications as swallowing is difficult for her.    Medication reconciliation completed by provider prior to medication history? No    Time spent in this activity: 5 min      Prior to Admission medications    Not on File

## 2020-08-10 NOTE — PROGRESS NOTES
[facility]    Medicine Progress Note - Hospitalist Service       Date of Admission:  8/9/2020  Assessment & Plan   Cherrie Crespo is a 70-year-old female who presented to the ER with increasing shortness of breath and difficulty swallowing.  She had known laryngeal cancer and was recently started on radiation therapy.  ENT was consulted and she went to the operating room for a tracheostomy.  She was subsequently admitted to the ICU.  She did well in the ICU overnight and is stable for transfer out of the ICU.  The hospitalist service was contacted to assume care when she transfers out of the ICU.    Stage IV laryngeal cancer  Airway obstruction  -Status post awake tracheostomy with direct laryngoscopy on August 8, 2020 by Dr. Magdaleno Meza.  -She tolerated the procedure well and is able to transfer out of the ICU today.  -Postop management per ENT.  -Currently has a cuffed trach tube in place, will leave cuff inflated today per ENT recommendations.  -SLP consulted, will need swallow evaluation prior to oral intake when appropriate from an ENT standpoint.  May need to consider PEG tube pending clinical course.  -Will start IV fluids today, continue while n.p.o.  -Recheck labs in a.m.  -Will need education regarding trach cares prior to discharge.  -Radiation therapy on hold for now pending outpatient follow-up with ENT.     Diet: NPO for Medical/Clinical Reasons Except for: Ice Chips    DVT Prophylaxis: Pneumatic Compression Devices  Wolfe Catheter: not present  Code Status: Special code status: Pre-arrest intubation OK; NO intubation in full arrest, CPR, defibrillation, or code drugs.         Disposition Plan   Expected discharge: transfer out of ICU today  Entered: Juan Francisco Barker MD 08/10/2020, 11:52 AM       The patient's care was discussed with the Patient and ICU Consultant.    Juan Francisco Barker MD  Hospitalist Service  [facility]  Hospital    ______________________________________________________________________    Interval History   I was called this morning by the intensivist to assume attending care as the patient will be transferring out of the ICU. Cherrie Crespo was seen and examined.  She was sitting up in a chair and had no complaints.  Denies pain.  No trouble breathing.  Denies fevers, chest pain, nausea, abdominal pain, urinary symptoms, diarrhea, or constipation.  She denies any significant past medical history beyond her laryngeal cancer.  She does not take any medications at home.    Data reviewed today: I reviewed all medications, new labs and imaging results over the last 24 hours. I personally reviewed no images or EKG's today.    Physical Exam   Vital Signs: Temp: 97.8  F (36.6  C) Temp src: Oral BP: (!) 126/90 Pulse: 92 Heart Rate: 85 Resp: 23 SpO2: 94 % O2 Device: Trach dome Oxygen Delivery: 20 LPM  Weight: 114 lbs 3.17 oz  Constitutional: awake, alert, cooperative, no apparent distress, sitting up in a chair  ENT: oral pharynx with moist mucous membranes, trach  Respiratory: clear to auscultation bilaterally, no crackles or wheezing  Cardiovascular: regular rate and rhythm, normal S1 and S2, no murmur noted  GI: normal bowel sounds, soft, non-distended, non-tender  Skin: warm, dry  Musculoskeletal: no lower extremity pitting edema present  Neurologic: awake, alert, oriented to name, place and time. motor is 5 out of 5 bilaterally. sensory is intact.    Data   Recent Labs   Lab 08/09/20  1229   WBC 3.1*   HGB 13.2   MCV 88      INR 1.00      POTASSIUM 3.9   CHLORIDE 106   CO2 28   BUN 11   CR 0.92   ANIONGAP 3   LOKESH 8.9   *     No results found for this or any previous visit (from the past 24 hour(s)).    Medications       iopamidol  96 mL Intravenous Once     sodium chloride 0.9 %  70 mL Intravenous Once

## 2020-08-10 NOTE — OP NOTE
Procedure Date: 08/09/2020      SURGEON:  Magdaleno Meza MD.      PREOPERATIVE DIAGNOSIS:  Laryngeal cancer, airway obstruction.      POSTOPERATIVE DIAGNOSIS:  Laryngeal cancer, airway obstruction.      PROCEDURE:  Awake tracheostomy, direct laryngoscopy.      ANESTHESIA:  Local anesthesia with monitored anesthesia care for the tracheostomy and general anesthesia for the direct laryngoscopy.      ESTIMATED BLOOD LOSS:  5 mL.      SPECIMENS:  None.      INDICATIONS FOR PROCEDURE:  A 70-year-old female with known history of squamous cell carcinoma of the larynx and she is getting external beam radiation therapy and has received 5 treatments.  She has a narrow glottis and has been told by her ENT physician, Dr. Rosario, that she should have a tracheostomy due to the risk of swelling with the treatment.  She declined, however.  Now after 5 days of treatment, she, in fact, is having worsening difficulty breathing and mild stridor and she is ready to have a tracheostomy.  I saw her in the Emergency Department and at that time she was not in extremis, but has stridor, which is mild, and some difficulty breathing.  She has a very raspy voice.  She also has severe dysphagia and is having difficulty swallowing.  I recommended awake tracheostomy with direct laryngoscopy.  The risks and benefits were discussed in detail.      DESCRIPTION OF PROCEDURE:  The patient was taken to the operating room and laid on the operating room table and neck was placed into extension and monitored anesthesia care was established by the Anesthesia Team.  A timeout was called.  I marked the planned incision on the anterior low neck and injected the skin with a solution of 2% lidocaine with epinephrine 1:100,000.  The neck was sterilely prepped and draped in the usual fashion.  I made a horizontal incision through the skin with a 15 blade just inferior to the cricoid and then provided hemostasis with cautery and divided the subcutaneous fat.  I  continued dissecting down the midline and encountered a large vein, which was likely the anterior jugular vein on the right side, but this was not disrupted.  The vein was retracted laterally.  The strap muscles were encountered and the midline was identified and then dissected through the midline of the muscles, retracting them laterally.  The thyroid isthmus was identified and this was a small isthmus and was easily cauterized and divided with the electrocautery.  The isthmus was retracted laterally.  I could see the trachea.  I selected a tracheal ring, which was approximately a third ring, and made an incision through the membrane superior and inferior to the ring and then grasped the ring with a mosquito forceps and used a scissor to complete, removing the anterior aspect of the tracheal ring.  I could clearly see into the trachea.  I took the trach  and gently opened the tracheotomy and then placed a size 6 cuffed Shiley trach tube into the trachea and the cuff was inflated and the flange was secured in 4 quadrants with 2-0 silk sutures tied to the skin.  A trach tie around the neck was also placed and a Telfa was placed as a dressing underneath the flange.  Anesthesia then brought her deeper into anesthesia and the circuit was hooked up to the trach tube.  The table was turned and I used the upper incisor mouthguard and then used the Dedo laryngoscope.  She has extremely small mouth and she has a small mandible, which makes it very difficult to perform laryngoscopy.  There is also some swelling of the epiglottis and the arytenoids.  I could not get a good view of the glottis using the Dedo laryngoscope.  I then used the anesthesia MAC blade, but this was not successful as I still could not push the epiglottis out of the way, so I used the Finney blade, which I was able to get a glimpse of the vocal cords, which were very swollen and there was basically no glottic aperture.  There was no major mass.   Most of the obstruction is related to swelling of the larynx.  The instrumentation was all removed and she was handed over to Anesthesia for recovery and taking her to the Intensive Care Unit.  There were no complications.         JAMIA HAYES MD             D: 2020   T: 2020   MT: KATIE      Name:     CECILIA ABDI   MRN:      [id number]        Account:        [id number]   :      1950           Procedure Date: 2020      Document: [id number]       cc: Jamia Bhatti MD

## 2020-08-10 NOTE — PROGRESS NOTES
"ENT Progress note    POD #1 awake trach    Exam:  /78   Pulse 91   Temp 98.2  F (36.8  C) (Oral)   Resp (!) 37   Ht 1.6 m (5' 3\")   Wt 51.8 kg (114 lb 3.2 oz)   SpO2 100%   BMI 20.23 kg/m      Up in chair, breathing easily, communicating with writing.  C/o neck tightness but is breathing well.  Minor serosanguinous secretions, no breakdown, clot, or pus. Trach secure     WBC   Date Value Ref Range Status   08/09/2020 3.1 (L) 4.0 - 11.0 10e9/L Final       Assessment/Plan:  Stage IV laryngeal cancer, doing well with trach,  OK to transfer to floor. She had questions about her new peg tube cares and she will need education regarding trach cares. Typically I remove trach sutures in 5-7 days and change to a cuffless tube at that time which should allow for phonation. This can be done as an outpatient, but since she lives alone she may need some assistance with her cares for the next 1-2 weeks. Discussed with RN discharging planning and social work consult. May resume XRT treatment once tach changed.   Will order #6 cuffless trach tube to bedside to be sent home with patient that will be used for her trach change in clinic. I will see patient tomorrow and if doing well will deflate cuff which may allow some phonation.   "

## 2020-08-10 NOTE — PROGRESS NOTES
Patient arrived to station 33 at 1840. Obturator, appropriate replacement trach tube, ambu bag and mask all at bedside. Patient settled, VSS.

## 2020-08-10 NOTE — PLAN OF CARE
No major events overnight. Patient was very anxious and tearful overnight. PRN versed added for anxiety in which I had to have a long discussion on benefits of taking due to patient not wanting any medications. Patient is able to communicate by writing on paper. Remained on hi-rohith 30%and 20L overnight, satting 100%. BP high, especially when worked up d/t anxiety. Denies pain. Voiding via bedpan.

## 2020-08-11 ENCOUNTER — APPOINTMENT (OUTPATIENT)
Dept: SPEECH THERAPY | Facility: CLINIC | Age: 70
DRG: 013 | End: 2020-08-11
Attending: HOSPITALIST
Payer: COMMERCIAL

## 2020-08-11 LAB
ANION GAP SERPL CALCULATED.3IONS-SCNC: 5 MMOL/L (ref 3–14)
BUN SERPL-MCNC: 18 MG/DL (ref 7–30)
CALCIUM SERPL-MCNC: 8.5 MG/DL (ref 8.5–10.1)
CHLORIDE SERPL-SCNC: 109 MMOL/L (ref 94–109)
CO2 SERPL-SCNC: 24 MMOL/L (ref 20–32)
CREAT SERPL-MCNC: 0.86 MG/DL (ref 0.52–1.04)
ERYTHROCYTE [DISTWIDTH] IN BLOOD BY AUTOMATED COUNT: 12.7 % (ref 10–15)
GFR SERPL CREATININE-BSD FRML MDRD: 68 ML/MIN/{1.73_M2}
GLUCOSE SERPL-MCNC: 113 MG/DL (ref 70–99)
HCT VFR BLD AUTO: 36.8 % (ref 35–47)
HGB BLD-MCNC: 12.1 G/DL (ref 11.7–15.7)
MCH RBC QN AUTO: 28.7 PG (ref 26.5–33)
MCHC RBC AUTO-ENTMCNC: 32.9 G/DL (ref 31.5–36.5)
MCV RBC AUTO: 87 FL (ref 78–100)
PLATELET # BLD AUTO: 195 10E9/L (ref 150–450)
POTASSIUM SERPL-SCNC: 3.8 MMOL/L (ref 3.4–5.3)
RBC # BLD AUTO: 4.22 10E12/L (ref 3.8–5.2)
SODIUM SERPL-SCNC: 138 MMOL/L (ref 133–144)
WBC # BLD AUTO: 6 10E9/L (ref 4–11)

## 2020-08-11 PROCEDURE — 80048 BASIC METABOLIC PNL TOTAL CA: CPT | Performed by: HOSPITALIST

## 2020-08-11 PROCEDURE — 25000128 H RX IP 250 OP 636: Performed by: HOSPITALIST

## 2020-08-11 PROCEDURE — 40000275 ZZH STATISTIC RCP TIME EA 10 MIN

## 2020-08-11 PROCEDURE — 99232 SBSQ HOSP IP/OBS MODERATE 35: CPT | Performed by: HOSPITALIST

## 2020-08-11 PROCEDURE — 25000132 ZZH RX MED GY IP 250 OP 250 PS 637: Performed by: INTERNAL MEDICINE

## 2020-08-11 PROCEDURE — 85027 COMPLETE CBC AUTOMATED: CPT | Performed by: HOSPITALIST

## 2020-08-11 PROCEDURE — 99223 1ST HOSP IP/OBS HIGH 75: CPT | Performed by: NURSE PRACTITIONER

## 2020-08-11 PROCEDURE — 25800030 ZZH RX IP 258 OP 636: Performed by: HOSPITALIST

## 2020-08-11 PROCEDURE — 92507 TX SP LANG VOICE COMM INDIV: CPT | Mod: GN | Performed by: SPEECH-LANGUAGE PATHOLOGIST

## 2020-08-11 PROCEDURE — 92597 ORAL SPEECH DEVICE EVAL: CPT | Mod: GN | Performed by: SPEECH-LANGUAGE PATHOLOGIST

## 2020-08-11 PROCEDURE — 92610 EVALUATE SWALLOWING FUNCTION: CPT | Mod: GN | Performed by: SPEECH-LANGUAGE PATHOLOGIST

## 2020-08-11 PROCEDURE — 36415 COLL VENOUS BLD VENIPUNCTURE: CPT | Performed by: HOSPITALIST

## 2020-08-11 PROCEDURE — 12000000 ZZH R&B MED SURG/OB

## 2020-08-11 RX ORDER — CARBOXYMETHYLCELLULOSE SODIUM 5 MG/ML
1-2 SOLUTION/ DROPS OPHTHALMIC EVERY 8 HOURS PRN
Status: DISCONTINUED | OUTPATIENT
Start: 2020-08-11 | End: 2020-08-25 | Stop reason: HOSPADM

## 2020-08-11 RX ORDER — LORAZEPAM 2 MG/ML
0.5 CONCENTRATE ORAL EVERY 4 HOURS PRN
Status: DISCONTINUED | OUTPATIENT
Start: 2020-08-11 | End: 2020-08-25 | Stop reason: HOSPADM

## 2020-08-11 RX ORDER — ATROPINE SULFATE 10 MG/ML
1-2 SOLUTION/ DROPS OPHTHALMIC
Status: DISCONTINUED | OUTPATIENT
Start: 2020-08-11 | End: 2020-08-25 | Stop reason: HOSPADM

## 2020-08-11 RX ORDER — HYDROMORPHONE HYDROCHLORIDE 10 MG/ML
1-2 INJECTION INTRAMUSCULAR; INTRAVENOUS; SUBCUTANEOUS
Status: DISCONTINUED | OUTPATIENT
Start: 2020-08-11 | End: 2020-08-14

## 2020-08-11 RX ADMIN — HYDROMORPHONE HYDROCHLORIDE 0.5 MG: 1 INJECTION, SOLUTION INTRAMUSCULAR; INTRAVENOUS; SUBCUTANEOUS at 20:58

## 2020-08-11 RX ADMIN — Medication 1 ML: at 19:34

## 2020-08-11 RX ADMIN — POTASSIUM CHLORIDE, DEXTROSE MONOHYDRATE AND SODIUM CHLORIDE: 150; 5; 450 INJECTION, SOLUTION INTRAVENOUS at 08:55

## 2020-08-11 ASSESSMENT — ACTIVITIES OF DAILY LIVING (ADL)
ADLS_ACUITY_SCORE: 14
ADLS_ACUITY_SCORE: 13
ADLS_ACUITY_SCORE: 13
ADLS_ACUITY_SCORE: 14

## 2020-08-11 ASSESSMENT — MIFFLIN-ST. JEOR: SCORE: 1021.13

## 2020-08-11 NOTE — CONSULTS
Chart reviewed and updated by palliative care, patient desire for hospice. Will defer consult to SW for hospice planning.

## 2020-08-11 NOTE — PROVIDER NOTIFICATION
Pt is complaining of sore throat. is there a neb we can give her through her trach? or should I order chloraseptic Spray from the standing orders?    Paged

## 2020-08-11 NOTE — PROGRESS NOTES
"ENT Progress note    POD #2 Trach    She c/o neck discomfort but doesn't want to take pain Rx. Breathing well.     Exam:  /72   Pulse 69   Temp 98.2  F (36.8  C) (Oral)   Resp 16   Ht 1.6 m (5' 3\")   Wt 51.8 kg (114 lb 3.2 oz)   SpO2 99%   BMI 20.23 kg/m      Trach secure, no skin breakdown.  Cuff deflated and mucous suctioned from trach, no blood.   She was able to quietly phonate with the tube plugged    WBC   Date Value Ref Range Status   08/09/2020 3.1 (L) 4.0 - 11.0 10e9/L Final       Assessment/Plan:  Stage IV laryngeal cancer  Speech/swallow eval, if fails would recommend PEG tube  Cont trach cares. Sutures out, trach change to cuffless tube in 3-5 days-this can be done as an outpt.   #6 shiley trach tube, cuffless (NO cuff) to bedside for trach change.     Hudson Rosario MD  ENT [facility]  [phone]     "

## 2020-08-11 NOTE — PLAN OF CARE
A&Ox4. VSS with trach dome 29%. Trach in place with cuff inflated with in-line suctioning, speech consult tomorrow. NPO ex ice chips. IVF. Voiding. Denies pain. Up SBA.

## 2020-08-11 NOTE — PROGRESS NOTES
Respiratory ca re note. Removed sutures on trach flange. Cleaned stoma and flange. Placed dressing. RN is calling  for the #6 trach cuffless. ICU only has cuffed hardware. I will follow pt today. Elena

## 2020-08-11 NOTE — PROGRESS NOTES
Brief Palliative Care Team Note.    Formal consult completed, note to follow. Cherrie clearly indicated to me her desire to be kept comfortable and to be allowed to die. She states she does not wish to go through with more tests/procedures and wants no life prolonging interventions. Will begin planning for discharge with hospice, unit SW aware.     Nika TALAMANTES, LAMAR  Palliative Medicine  [phone]

## 2020-08-11 NOTE — PLAN OF CARE
"VSS.  Pt is A/O x 4.  Pt to be seen by palliative care today per MD orders.  Pt \"teary eyed\" when talking about palliative care, multiple hugs given to the pt and pt asked this RN to inform Yevgeniy on her contact list regarding this issue, Ron called and updated at the pt's bedside Trach in place and pt was suctioned a couple of times this shift.  Pt communicating with staff by witting on a piece of paper in the room.  Pt refused pain medications and anxiety medications when offered by this RN.  Pt is a SBA x 1 to the bathroom secondary to pt connected to IV and trach humidity equipment.  "

## 2020-08-11 NOTE — PLAN OF CARE
DATE & TIME: 8/10 5477-8506    Cognitive Concerns/ Orientation : AOx4   BEHAVIOR & AGGRESSION TOOL COLOR: green   ABNL VS/O2: VSS, trach with cuff inflated and dome @ 29% WDL, in line suctioning   MOBILITY: SBA to bathroom  PAIN MANAGMENT: denies pain  DIET: NPO with ice  BOWEL/BLADDER: up to bathroom  ABNL LAB/BG:    DRAIN/DEVICES: PIV infusing   SKIN: intact  TESTS/PROCEDURES: n/a  D/C DAY/GOALS/PLACE: pending  OTHER IMPORTANT INFO: Speech consult.      UPDATE 0700: trach deflated by MD.

## 2020-08-11 NOTE — PLAN OF CARE
Discharge Planner SLP   Patient plan for discharge: Comfort care  Current status: Passy Yuma Valve (PMV)/speaking valve and swallow evaluations were completed this pm.  RT was present initially during trial.  Pt tolerated Passy Yuma Valve (PMV) placement with cuff deflation in place while on room air for 45+ minutes.  Saturation levels remained above 97%, pt comfortable, and HR stable.  Hoarse weak voicing achieved.  Educated pt on strategies to use with PMV, need for cuff deflation at all times with PMV, and POC related to desire for comfort care.  Pt verbalized understanding.   Palliative care was able to discuss comfort care plan  per pt wishes during the PMV trial.      Recommend PMV use with cuff deflation per pt request while on trach dome and/or room air.  Cuff must be deflated for PMV use.  Nursing to place and remove PMV per pt request.  No further Tx indicated given comfort care plan.      Pt presents with mod-severe oral-pharyngeal dysphagia per completed swallow eval.  Deficits/risk factors include mild decreased oral coordination, delayed swallows, trach tube, decreased laryngeal elevation, and need for multiple swallows.  Pt demonstrated overt cough with thin liquids, honey thick liquids, and solid trials.  No blue dye found in secretions; however, overt aspiration/cough observed.  Pt tolerated nectar thick by spoon with assist without overt signs of aspiration.      Pt indicated wishes for comfort care and no further Tx.  Safest po option is nectar clear liquids by spoon with cuff deflated/PMV in place.  Will defer to MD/care team/patient for diet order.    Plan to discontinue SLP services given pt wishes for comfort care.  Please page SLP at [phone] with any questions on 8/12.    Barriers to return to prior living situation: No SLP barriers  Recommendations for discharge: Per medical POC/comfort care wishes  Rationale for recommendations: No further Tx is indicated per comfort care plan;  Please re-consult as appropriate.       Entered by: Gwen Taveras 08/11/2020 2:23 PM      Speech Language Therapy Discharge Summary    Reason for therapy discharge:    Change in medical status.    Progress towards therapy goal(s). See goals on Care Plan in [facility] electronic health record for goal details.  Comfort care    Therapy recommendation(s):    No further therapy is recommended.

## 2020-08-11 NOTE — PROGRESS NOTES
[facility]    Medicine Progress Note - Hospitalist Service       Date of Admission:  8/9/2020  Assessment & Plan   Cherrie Crespo is a 70-year-old female who presented to the ER with increasing shortness of breath and difficulty swallowing.  She had known laryngeal cancer and was recently started on radiation therapy.  ENT was consulted and she went to the operating room for a tracheostomy.  She was subsequently admitted to the ICU.  She did well in the ICU overnight and is stable for transfer out of the ICU.  The hospitalist service was contacted to assume care when she transfers out of the ICU.    Stage IV laryngeal cancer  Airway obstruction  -Status post awake tracheostomy with direct laryngoscopy on August 9, 2020 by Dr. Magdaleno Meza.  -She tolerated the procedure well and transferred out of the ICU on 8/10/20.  -Postop management per ENT.  -Cuffed trach tube deflated by ENT today.  -SLP consulted today.  -Continue IV fluids until reliably tolerating oral intake.  -Labs OK this morning.  -Will need education regarding trach cares prior to discharge.  -Radiation therapy on hold for now pending outpatient follow-up with ENT.    Addendum  -Received a call from Dr. Bhatti.  He recommended pursuing PEG tube placement while patient was in the hospital.  Even if she does well with current swallow evaluation, he expects as she continues with radiation that she will develop worsening dysphasia and ultimately need a PEG tube.  -Discussed this with the patient today.  She states she only wants a PEG tube if absolutely necessary.  She wants to work with SLP today and see how that goes before she makes any decisions.  -She also made statements about wishing that they would just keep her comfortable and let her die.  We talked further about this and she would like to meet with palliative care to discuss her goals of care.  Palliative care consult entered.     Diet: NPO for Medical/Clinical Reasons Except for:  Ice Chips    DVT Prophylaxis: Pneumatic Compression Devices  Wolfe Catheter: not present  Code Status: Special code status: Pre-arrest intubation OK; NO intubation in full arrest, CPR, defibrillation, or code drugs.         Disposition Plan   Expected discharge: home in 1-2 days pending SLP evaluation, how she tolerates advancing diet  Entered: Juan Francisco Barker MD 08/11/2020, 9:05 AM       The patient's care was discussed with the Bedside Nurse and Patient.    Juan Francisco Barker MD  Hospitalist Service  [facility]    ______________________________________________________________________    Interval History   Cherrie Crespo feels OK this morning. Some mild pain and anxiety, but doesn't want any medication for this. Denies fevers, chest pain, shortness of breath, nausea, abdominal pain. Anxious to go home.    Data reviewed today: I reviewed all medications, new labs and imaging results over the last 24 hours. I personally reviewed no images or EKG's today.    Physical Exam   Vital Signs: Temp: 98.2  F (36.8  C) Temp src: Oral BP: 137/79 Pulse: 69 Heart Rate: 67 Resp: 16 SpO2: 100 % O2 Device: Trach dome Oxygen Delivery: 20 LPM  Weight: 118 lbs 6.19 oz  Constitutional: awake, alert, cooperative, no apparent distress  HEENT: trach  Respiratory: clear to auscultation bilaterally, no crackles or wheezing  Cardiovascular: regular rate and rhythm, normal S1 and S2, no murmur noted  GI: normal bowel sounds, soft, non-distended, non-tender  Skin: warm, dry  Musculoskeletal: no lower extremity pitting edema present  Neurologic: awake, alert, oriented to name, place and time. motor is 5 out of 5 bilaterally. sensory is intact.    Data   Recent Labs   Lab 08/11/20  0811 08/09/20  1229   WBC 6.0 3.1*   HGB 12.1 13.2   MCV 87 88    221   INR  --  1.00    137   POTASSIUM 3.8 3.9   CHLORIDE 109 106   CO2 24 28   BUN 18 11   CR 0.86 0.92   ANIONGAP 5 3   LOKESH 8.5 8.9   * 129*     Medications      dextrose 5% and 0.45% NaCl + KCl 20 mEq/L 75 mL/hr at 08/11/20 0855

## 2020-08-11 NOTE — PROGRESS NOTES
08/11/20 1325   General Information   Patient Profile Review See Profile for full history and prior level of function   Onset of Illness/Injury, or Date of Surgery - Date 08/09/20   Start of Care Date 08/11/20   Date of Tracheostomy Placement 08/09/20   Referring Physician Dr. Barker   Patient/Family Goals Statement No further Tx, comfort care   Treatment Diagnosis Aphonia due to trach tube, airway obstruction   Current Communication Written expression;Mouthing words   Comments Per  MD note: Cherrie Crespo is a 70-year-old female who presented to the ER with increasing shortness of breath and difficulty swallowing.  She had known laryngeal cancer and was recently started on radiation therapy.  ENT was consulted and she went to the operating room for a tracheostomy.  She was subsequently admitted to the ICU.  She did well in the ICU overnight and is stable for transfer out of the ICU,  Stage IV laryngeal cancer, Airway obstruction, Status post awake tracheostomy with direct laryngoscopy on August 8, 2020 by Dr. Magdaleno Meza..  Cont trach cares. Sutures out, trach change to cuffless tube in 3-5 days-this can be done as an outpt.      RT has kept cuff deflated since this am.  Pt tolerating well.     Evaluation   Type of Trach Shiley   Size of Trach 6 mm   Oxygen Supply   (Room Air during trial)   Cuff Inflated at Onset of Evaluation No   Orders received to deflate cuff for PMSV trial Yes   Oxygen saturation before cuff deflation 100 %   Oxygen saturation after cuff deflation 99 %   Total amount of time with PMSV placement: 45  (Hoarse weak voicing achieved, no SOB/discomfort)   Total amount of time with leak speech   (No voice on leak speech)   Cuff re-inflated after PMSV trials: No  (Cuff deflated per RT plan)   Prognosis/Impression   SLP Diagnosis Aphonia due to trach tube, airway obstruction   Rehab Potential Fair, will monitor progress closely   Therapy Frequency   (1x - met)   Predicted Duration of Therapy  Intervention (days/wks)   (1x- met)   Risks and Benefits of Treatment have been explained. yes   Patient, Family & other staff in agreement with plan of care yes   Clinical Impression Comments Pt tolerated [place] Valve (PMV) placement with cuff deflation in place while on room air for 45+ minutes.  Saturation levels remained above 97%, pt comfortable, and HR stable.  Hoarse weak voicing achieved.  Educated pt on strategies to use with PMV, need for cuff deflation at all times with PMV, and POC related to desire for comfort care.  Pt verbalized understanding.    Total Evaluation Time   Total Evaluation Time (Minutes) 30

## 2020-08-11 NOTE — PROGRESS NOTES
08/11/20 1300   General Information   Onset Date 08/09/20   Start of Care Date 08/11/20   Referring Physician Dr. Barker   Patient Profile Review/OT: Additional Occupational Profile Info See Profile for full history and prior level of function   Patient/Family Goals Statement To be comfortable, no more Tx   Swallowing Evaluation Bedside swallow evaluation  (Blue Dye)   Behaviorial Observations Alert   Mode of current nutrition NPO   Respiratory Status Tracheostomy  ([place] Valve (PMV) Shiley #6 cuffed - cuff deflated)   Type of O2 supply   (High flow 10L prior to PMV/eval; RA for eval)   Comments Per  MD note: Cherrie Crespo is a 70-year-old female who presented to the ER with increasing shortness of breath and difficulty swallowing.  She had known laryngeal cancer and was recently started on radiation therapy.  ENT was consulted and she went to the operating room for a tracheostomy.  She was subsequently admitted to the ICU.  She did well in the ICU overnight and is stable for transfer out of the ICU,      Stage IV laryngeal cancer, Airway obstruction, Status post awake tracheostomy with direct laryngoscopy on August 8, 2020 by Dr. Magdaleno Meza..  Cont trach cares. Sutures out, trach change to cuffless tube in 3-5 days-this can be done as an outpt.       RT has kept cuff deflated since this am.  Pt tolerating well.  PMV on for swallow eval on RA, saturation levels stable above 97%.   Clinical Swallow Evaluation   Oral Musculature generally intact  (mild decreased coordination)   Dentition present and adequate  (missing a few teeth)   Laryngeal Function   (Hoarse weak voicing with PMV)   Clinical Swallow Eval: Thin Liquid Texture Trial   Mode of Presentation, Thin Liquids spoon;cup   Volume of Liquid or Food Presented ice chips x 2, sip by cup x 1   Oral Phase of Swallow Premature pharyngeal entry   Pharyngeal Phase of Swallow reduction in laryngeal movement;repeated swallows  (delay)   Diagnostic Statement  cough immediate after thin by cup   Clinical Swallow Eval: Nectar Thick Liquid Texture Trial   Mode of Presentation, Nectar spoon   Volume of Nectar Presented tsp x 1   Oral Phase, Nectar Premature pharyngeal entry   Pharyngeal Phase, Nectar reduction in laryngeal movement;repeated swallows  (delay)   Diagnostic Statement declined additional trials, felt OJ burned   Clinical Swallow Eval: Honey Thick Liquid Texture Trial   Mode of Presentation, Honey spoon   Volume of Honey Presented tsp x 1   Oral Phase, Honey Premature pharyngeal entry   Pharyngeal Phase, Honey reduction in laryngeal movement;repeated swallows  (delay)   Diagnostic Statement cough   Clinical Swallow Eval: Solid Food Texture Trial   Mode of Presentation, Solid self-fed   Volume of Solid Food Presented bites x 2   Oral Phase, Solid Premature pharyngeal entry   Pharyngeal Phase, Solid reduction in laryngeal movement;repeated swallows  (delay)   Diagnostic Statement cough, declined additional trials   Swallow Compensations   Swallow Compensations Pacing;Reduce amounts;Multiple swallow   Swallow Eval: Clinical Impressions   Skilled Criteria for Therapy Intervention Skilled criteria met.  Treatment indicated.   Functional Assessment Scale (FAS) 2   Treatment Diagnosis mod-severe oral-pharyngeal dysphagia   Diet texture recommendations Clear liquid  (nectar thick)   Therapy Frequency   (no further Tx per pt request comfort care)   Anticipated Discharge Disposition   (per comfort care plan)   Risks and Benefits of Treatment have been explained. Yes   Patient, family and/or staff in agreement with Plan of Care Yes   Clinical Impression Comments Pt presents with mod-severe oral-pharyngeal dysphagia at bedside.  Deficits/risk factors include mild decreased oral coordination, delayed swallows, trach tube, decreased laryngeal elevation, and need for multiple swallows.  Pt demonstrated overt cough with thin liquids, honey thick liquids, and solid trials.  No  blue dye found in secretions; however, overt aspiration/cough observed.  Pt tolerated nectar thick by spoon with assist without overt signs of aspiration.  Pt indicated wishes for comfort care and no further Tx.  Safest po option is nectar clear liquids by spoon with cuff deflated/PMV in place.  Will defer to MD/care team/patient for diet order.   Total Evaluation Time   Total Evaluation Time (Minutes) 15

## 2020-08-11 NOTE — CONSULTS
"RN consult received - Pt is NPO after trach placement    Chart reviewed  8/9: tracheostomy   8/11: SLP ---> mod-severe oral-pharyngeal dysphagia per completed swallow eval    8/11: Per MD - \"Even if she does well with current swallow evaluation, expect as she continues with radiation that she will develop worsening dysphasia and ultimately need a PEG tube.\"  Palliative met with pt this afternoon - \"Cherrie clearly indicated to me her desire to be kept comfortable and to be allowed to die. She states she does not wish to go through with more tests/procedures and wants no life prolonging interventions. Will begin planning for discharge with hospice\"  Diet: Regular as desires  No nutrition intervention at this time  Will be available if needed    Jo Taylor RD, LD  Clinical Dietitian - [facility]   Pager - [phone]'  "

## 2020-08-11 NOTE — CONSULTS
"[facility]  Palliative Care Consultation Note    Patient: Cherrie Crespo  Date of Admission:  8/9/2020    Requesting Clinician / Team: Dr Barker  Reason for consult: Goals of care    Recommendations:    Comfort measures only, pt specifically stated \"do not do anything to prolong my life.\"    Dispo planning with hospice, pt defers planning/logistics to her brother in law Yevgeniy (unit SW aware)    Hydromorphone 1-2 mg SL q 2 hours prn pain/dyspnea (IV available if sx's not controlled with oral regimen)    Lorazepam 0.5 mg SL q 4 hours prn agitation/restlessness/anxiety    These recommendations have been discussed with Dr Barker, pt's bedside RN.      Thank you for the opportunity to participate in the care of this patient and family. Our team: will continue to follow.     During regular M-F work hours -- if you are not sure who specifically to contact -- please contact us on our team pager at [phone].    After regular work hours and on weekends/holidays, you can call our answering service at [phone]. Also, who's on call for us is available in [employer].     Nika TALAMANTES [facility]  Palliative Medicine  [phone] (team pager)  [phone] (after hours/weekends)    Attestation:  Total time on the floor involved in the patient's care: 70 minutes  Total time spent in counseling/care coordination: >50%      Assessments:  Cherrie Crespo is a 70-year-old female who presented to the ER with increasing shortness of breath and difficulty swallowing.  She had known laryngeal cancer and was recently started on radiation therapy.  ENT was consulted and she went to the operating room for a tracheostomy. She is now stable post op and requesting to discuss goals of care.    Today, the patient was seen for:  Goals of Care    Met with Cherrie this afternoon. She just had a speaking valve placed and we were able to have a nice conversation prior to her becoming tired. She began the " conversation by clearing stating she no longer wished to continue with restorative treatments and requested I make her comfortable and allow her to die. She stated she doesn't think she has long and hopes it will be quick. She also stated she has no worries about dying. She lives independently and is happy that she has been able to do so and manage on her own for so long. Considering her goals are to be comfortable we discussed a discharge plan which would include support from the hospice team. Cherrie was not familiar with the hospice team, I shared with her the benefits/services and where these types of cares can be provided. Cherrie tells me she cannot afford a facility or private care givers. Considering her new trach I am not sure if going home to be alone is a safe option. She has support from her brother in law Yevgeniy, but this is not 24 hour care. Cherrie said she will defer to Yevgeniy to work out the logistics of where she will be taken care of and which hospice agency to work with. Cherrie understands that time is short and though she does not like to take opioids understands these medications will be necessary to alleviate any symptoms of pain/dyspnea she may experience as she is nearing the end of her life.     Prognosis, Goals, & Planning:      Functional Status just prior to hospitalization: 1 (Restricted in physically strenuous activity but ambulatory and able to carry out work of a light or sedentary nature)      Prognosis, Goals, and/or Advance Care Planning were addressed today: Yes        Summary/Comments:       Patient's decision making preferences: independently          Patient has decision-making capacity today for complex decisions: Yes            I have concerns about the patient/family's health literacy today: No           Patient has a completed Health Care Directive: Reportedly yes, but not available to us currently.      Code status: DNR/DNI    Coping, Meaning, & Spirituality:   Mood, coping, and/or  meaning in the context of serious illness were addressed today: Yes  Summary/Comments: finds comfort in her agustina, recently had 3 pastors pray over her, feels well supported by her pastors    Social:     Living situation: lives independently in her apartment in [place]    [place] family / caregivers: , no children, has support from her brother in laws (specifically Yvegeniy)    History of Present Illness:  History gathered today from: patient, medical chart, medical team members    Adopted from H&P      Key Palliative Symptom Data:  # Pain severity the last 12 hours: low  # Dyspnea severity the last 12 hours: moderate  # Nausea severity the last 12 hours: none  # Anxiety severity the last 12 hours: none    Patient is on opioids: bowels not assessed today.    ROS:  Comprehensive ROS is reviewed and is negative except as per HPI.     Past Medical History:  Past Medical History:   Diagnosis Date     HTN, goal below 140/90      HYPERLIPIDEMIA NEC/NOS 9/7/2004     OSTEOPENIA 4/30/2008        Past Surgical History:  Past Surgical History:   Procedure Laterality Date     CHOLECYSTECTOMY, OPEN       HYSTERECTOMY, PAP NO LONGER INDICATED      For dysfunctional uterine bleeding     LARYNGOSCOPY N/A 8/9/2020    Procedure: DIRECT LARYNGOSCOPY;  Surgeon: Magdaleno Meza MD;  Location:  OR     LARYNGOSCOPY WITH BIOPSY(IES) N/A 7/10/2020    Procedure: Microscopic direct laryngoscopy with biopsies;  Surgeon: Hudson Rosario MD;  Location:  OR     LARYNGOSCOPY WITH MICROSCOPE N/A 1/31/2020    Procedure: MICRO DIRECT LARYNGOSCOPY with LASER;  Surgeon: Hudson Rosario MD;  Location:  OR     LARYNGOSCOPY, EXCISE VOCAL CORD LESION, COMBINED N/A 1/31/2020    Procedure: EXCISION OF VOCAL CORD MASS;  Surgeon: Hudson Rosario MD;  Location:  OR     TRACHEOSTOMY N/A 8/9/2020    Procedure: AWAKE TRACHEOSTOMY;  Surgeon: Magdaleno Meza MD;  Location:  OR         Family History:  Family History   Problem Relation Age of Onset      C.A.D. Mother         By PAss     Cancer Father         Lung Cancer     Allergies No family hx of          Allergies:  Allergies   Allergen Reactions     Penicillins Shortness Of Breath and Rash     Sulfa Drugs Shortness Of Breath and Rash        Medications:  I have reviewed this patient's medication profile and medications from this hospitalization.   Current Facility-Administered Medications Ordered in Epic   Medication Dose Route Frequency Last Rate Last Dose     dextrose 5% and 0.45% NaCl + KCl 20 mEq/L infusion   Intravenous Continuous 75 mL/hr at 08/11/20 0855       HYDROmorphone (PF) (DILAUDID) injection 0.3-0.5 mg  0.3-0.5 mg Intravenous Q2H PRN   0.5 mg at 08/09/20 2249     naloxone (NARCAN) injection 0.1-0.4 mg  0.1-0.4 mg Intravenous Q2 Min PRN         ondansetron (ZOFRAN-ODT) ODT tab 4 mg  4 mg Oral Q6H PRN        Or     ondansetron (ZOFRAN) injection 4 mg  4 mg Intravenous Q6H PRN         No current [facility]-ordered outpatient medications on file.       Physical Exam:  Vital Signs: Temp: 98.7  F (37.1  C) Temp src: Oral BP: 137/79 Pulse: 69 Heart Rate: 67 Resp: 16 SpO2: 97 % O2 Device: Trach dome Oxygen Delivery: 20 LPM  Vitals:    08/09/20 1700 08/10/20 0000 08/11/20 0600   Weight: 51.8 kg (114 lb 3.2 oz) 51.8 kg (114 lb 3.2 oz) 53.7 kg (118 lb 6.2 oz)     CONSTITUTIONAL: NAD, A&Ox3. Calm and cooperative.  HEENT: NCAT, dry MM, trach present  CARDIOVASCULAR: exam deferred   RESPIRATORY: mildly labored work of breathing, congested cough noted  GASTROINTESTINAL: exam deferred   MUSCULOSKELETAL: Moving freely in bed   SKIN: Warm and intact. No concerning lesions or rashes on exposed skin surfaces   NEUROLOGIC: Appropriately responsive during interview  PSYCH: Affect congruent     Data reviewed:  Recent imaging reviewed, my comments on pertinents:   n/a    Recent lab data reviewed, my comments on pertinents:   Results for orders placed or performed during the hospital encounter of 08/09/20 (from the past  24 hour(s))   Glucose by meter   Result Value Ref Range    Glucose 78 70 - 99 mg/dL   Basic metabolic panel   Result Value Ref Range    Sodium 138 133 - 144 mmol/L    Potassium 3.8 3.4 - 5.3 mmol/L    Chloride 109 94 - 109 mmol/L    Carbon Dioxide 24 20 - 32 mmol/L    Anion Gap 5 3 - 14 mmol/L    Glucose 113 (H) 70 - 99 mg/dL    Urea Nitrogen 18 7 - 30 mg/dL    Creatinine 0.86 0.52 - 1.04 mg/dL    GFR Estimate 68 >60 mL/min/[1.73_m2]    GFR Estimate If Black 78 >60 mL/min/[1.73_m2]    Calcium 8.5 8.5 - 10.1 mg/dL   CBC with platelets   Result Value Ref Range    WBC 6.0 4.0 - 11.0 10e9/L    RBC Count 4.22 3.8 - 5.2 10e12/L    Hemoglobin 12.1 11.7 - 15.7 g/dL    Hematocrit 36.8 35.0 - 47.0 %    MCV 87 78 - 100 fl    MCH 28.7 26.5 - 33.0 pg    MCHC 32.9 31.5 - 36.5 g/dL    RDW 12.7 10.0 - 15.0 %    Platelet Count 195 150 - 450 10e9/L

## 2020-08-11 NOTE — PROGRESS NOTES
PT is A&O, VSS on humidified air. C/O sore throat, paged night hospitalist. Trach in place, dressing is CDI. Suctioned x2. SBA to BR d/t tethering cords. Pt considering palliative care.

## 2020-08-12 PROCEDURE — 40000983 ZZH STATISTIC HFNC ADULT NON-CPAP

## 2020-08-12 PROCEDURE — 40000275 ZZH STATISTIC RCP TIME EA 10 MIN

## 2020-08-12 PROCEDURE — 99232 SBSQ HOSP IP/OBS MODERATE 35: CPT | Performed by: HOSPITALIST

## 2020-08-12 PROCEDURE — 25000132 ZZH RX MED GY IP 250 OP 250 PS 637: Performed by: NURSE PRACTITIONER

## 2020-08-12 PROCEDURE — 25000128 H RX IP 250 OP 636: Performed by: NURSE PRACTITIONER

## 2020-08-12 PROCEDURE — 25000128 H RX IP 250 OP 636: Performed by: HOSPITALIST

## 2020-08-12 PROCEDURE — 12000000 ZZH R&B MED SURG/OB

## 2020-08-12 PROCEDURE — 40000008 ZZH STATISTIC AIRWAY CARE

## 2020-08-12 PROCEDURE — 99233 SBSQ HOSP IP/OBS HIGH 50: CPT | Performed by: NURSE PRACTITIONER

## 2020-08-12 RX ORDER — HYDROMORPHONE HYDROCHLORIDE 1 MG/ML
.3-.5 INJECTION, SOLUTION INTRAMUSCULAR; INTRAVENOUS; SUBCUTANEOUS
Status: DISCONTINUED | OUTPATIENT
Start: 2020-08-12 | End: 2020-08-19

## 2020-08-12 RX ORDER — LORAZEPAM 2 MG/ML
0.5 INJECTION INTRAMUSCULAR
Status: DISCONTINUED | OUTPATIENT
Start: 2020-08-12 | End: 2020-08-17

## 2020-08-12 RX ORDER — LORAZEPAM 2 MG/ML
0.5 INJECTION INTRAMUSCULAR EVERY 4 HOURS PRN
Status: DISCONTINUED | OUTPATIENT
Start: 2020-08-12 | End: 2020-08-12

## 2020-08-12 RX ADMIN — HYDROMORPHONE HYDROCHLORIDE 0.5 MG: 1 INJECTION, SOLUTION INTRAMUSCULAR; INTRAVENOUS; SUBCUTANEOUS at 11:18

## 2020-08-12 RX ADMIN — LORAZEPAM 0.5 MG: 2 INJECTION INTRAMUSCULAR; INTRAVENOUS at 23:00

## 2020-08-12 RX ADMIN — LORAZEPAM 0.5 MG: 2 INJECTION INTRAMUSCULAR; INTRAVENOUS at 12:05

## 2020-08-12 RX ADMIN — HYDROMORPHONE HYDROCHLORIDE 0.5 MG: 1 INJECTION, SOLUTION INTRAMUSCULAR; INTRAVENOUS; SUBCUTANEOUS at 17:00

## 2020-08-12 RX ADMIN — HYDROMORPHONE HYDROCHLORIDE 1 MG: 10 INJECTION, SOLUTION INTRAMUSCULAR; INTRAVENOUS; SUBCUTANEOUS at 12:40

## 2020-08-12 ASSESSMENT — ACTIVITIES OF DAILY LIVING (ADL)
ADLS_ACUITY_SCORE: 13

## 2020-08-12 NOTE — PROVIDER NOTIFICATION
MD Notification    Notified Person: Dr. Barker    Notification Date/Time: 8/12/2020 10:46 AM     Notification Interaction: paged    Purpose of Notification: pt having difficulty swallowing, trach suction not helping. comfort cares. please advise.    Orders Received:     Comments:

## 2020-08-12 NOTE — CONSULTS
Writer received hospice referral from [facility]. Writer was informed that patient Is currently on 25 LPM high flow oxygen.  Writer checked with hospice DME supplier and medical team who confirmed that hospice is only able to supply oxygen at a maximum of 15 LPM in a home environment.   Patient is requiring high flow at this time and would likely not tolerate weaning.  Writer attempted to call patient's room to discuss findings with her, however there was no answer.  Writer will attempt to call later today.     Thank you for this consult,    Tess Machado RN  [facility] Referral Specialist

## 2020-08-12 NOTE — PROVIDER NOTIFICATION
MD Notification    Notified Person: Janelle Mercedes with paliative    Notification Date/Time: 8/12/2020 11:37 AM     Notification Interaction: paged    Purpose of Notification: pt having difficulty swallowing and breathing, only has po ativan, requesting sublingual or IV    Orders Received:     Comments:

## 2020-08-12 NOTE — PROGRESS NOTES
[facility]  Palliative Care Daily Progress Note       Recommendations & Counseling       Comfort measures only    Pt requesting to remove high flow oxygen today, will premed with ativan and dilaudid and proceed with compassionate removal of oxygen    Anticipate pt will die in the hospital, will stop discharge planning at this time        Assessments          Cherrie Crespo is a 70-year-old female who presented to the ER with increasing shortness of breath and difficulty swallowing.  She had known laryngeal cancer and was recently started on radiation therapy.  ENT was consulted and she went to the operating room for a tracheostomy. She is now stable post op and requesting to discuss goals of care.    Today, the patient was seen for:  Cancer related symptom management, GOC    Prognosis, Goals, or Advance Care Planning was addressed today with: Yes.  Mood, coping, and/or meaning in the context of serious illness were addressed today: Yes.  Summary/Comments:    Checked in with Cherrie this morning. She is having difficulty swallowing and due to this is experiencing discomfort and anxiety. We reviewed what medications we will use to get her more comfortable. We learned this morning her oxygen needs are currently too high to leave the hospital and I discussed with her the option of attempting to wean down vs removing all together (acknowledging that we anticipate time will be limited if we move forward with this plan). Cherrie cleared indicated her desire to have the oxygen removed now. We reviewed the plan to administer premedications and compassionately remove the oxygen today, her Brother in law Yevgeniy is at bedside.     Thank you for involving us in the care of this patient and family. We will continue to follow. Please do not hesitate to contact me with questions or concerns or the on-call provider for our team if evening or weekend.    Nika TALAMANTES, CNP  Palliative Medicine  [phone]  "  [phone] (team pager)  [phone] (after hours/weekends)    Attestation:  Total time on the floor involved in the patient's care: 45 minutes  Total time spent in counseling/care coordination: >50%            Interval History:     Chart review/discussion with unit or clinical team members:   Updated Dr Barker, pt's bedside RN, unit SW, and [facility] hospice liaison    Per patient or family/caregivers today:  Pt brother in law at bedside, pt indicates her wish to be \"done\" and requests we remove the oxygen now.    Key Palliative Symptoms:  # Pain severity the last 12 hours: none  # Dyspnea severity the last 12 hours: moderate  # Nausea severity the last 12 hours: none  # Anxiety severity the last 12 hours: moderate    Patient is on opioids: bowels not assessed today.           Review of Systems:     Besides above, ROS was reviewed and is unremarkable          Medications:     I have reviewed this patient's medication profile and medications during this hospitalization.    Noted meds:    Current Facility-Administered Medications Ordered in Epic   Medication Dose Route Frequency Last Rate Last Dose     atropine 1 % ophthalmic solution 1-2 drop  1-2 drop Sublingual Q1H PRN         carboxymethylcellulose PF (REFRESH PLUS) 0.5 % ophthalmic solution 1-2 drop  1-2 drop Both Eyes Q8H PRN         HYDROmorphone (DILAUDID) (HIGH CONC) oral solution 1-2 mg  1-2 mg Sublingual Q2H PRN         HYDROmorphone (PF) (DILAUDID) injection 0.3-0.5 mg  0.3-0.5 mg Intravenous Q15 Min PRN         LORazepam (ATIVAN) 2 MG/ML (HIGH CONC) solution 0.5 mg  0.5 mg Oral Q4H PRN         LORazepam (ATIVAN) injection 0.5 mg  0.5 mg Intravenous Q15 Min PRN         naloxone (NARCAN) injection 0.1-0.4 mg  0.1-0.4 mg Intravenous Q2 Min PRN         ondansetron (ZOFRAN-ODT) ODT tab 4 mg  4 mg Oral Q6H PRN        Or     ondansetron (ZOFRAN) injection 4 mg  4 mg Intravenous Q6H PRN         phenol (CHLORASEPTIC) 1.4 % spray 1 mL  1 spray Mouth/Throat Q1H PRN "   1 mL at 08/11/20 1934     No current [facility]-ordered outpatient medications on file.              Physical Exam:   Temp: 98.6  F (37  C) Temp src: Oral BP: 112/69 Pulse: 81 Heart Rate: 67 Resp: 16 SpO2: 99 % O2 Device: Trach dome Oxygen Delivery: 25 LPM   Vitals:    08/09/20 1700 08/10/20 0000 08/11/20 0600   Weight: 51.8 kg (114 lb 3.2 oz) 51.8 kg (114 lb 3.2 oz) 53.7 kg (118 lb 6.2 oz)     CONSTITUTIONAL: NAD, A&Ox3. Calm and cooperative.  HEENT: NCAT, MMM, trach present  CARDIOVASCULAR: exam deferred   RESPIRATORY: mildly labored work of breathing noted on oxygen via trach dome  GASTROINTESTINAL: exam deferred  MUSCULOSKELETAL: Moving freely in bed   SKIN: Warm and intact. No concerning lesions or rashes on exposed skin surfaces   NEUROLOGIC: Appropriately responsive during interview  PSYCH: Affect congruent            Data Reviewed:     Reviewed recent pertinent imaging, comments:   n/a    Reviewed recent labs, comments:   No results found for this or any previous visit (from the past 24 hour(s)).

## 2020-08-12 NOTE — PROGRESS NOTES
SPIRITUAL HEALTH SERVICES Progress Note  [facility]    Met with pt and her brother-in-law Mingo.  Pt was communicating with me through use of writing notes and speaking (although it was difficult for me to understand her without benefit of her speaking valve in place).  Pt told me that she is dying.  She asked me to contact her , Marlon at [facility] in [place].  I called and left a message for him.  Pt would like to speak with him by phone or, better yet, have him visit.  As pt is moving to comfort care, hopefully her  can be on the short list of visitors to see her here at the [facility].   team also available for on-going support.                                                                                                                                                   Aissatou Lujan M.A.  Staff   Pager [phone]  Phone [phone]

## 2020-08-12 NOTE — PROGRESS NOTES
[facility]    Medicine Progress Note - Hospitalist Service       Date of Admission:  8/9/2020  Assessment & Plan   Cherrie Crespo is a 70-year-old female who presented to the ER with increasing shortness of breath and difficulty swallowing.  She had known laryngeal cancer and was recently started on radiation therapy.  ENT was consulted and she went to the operating room for a tracheostomy.  She was subsequently admitted to the ICU.  She did well in the ICU overnight and is stable for transfer out of the ICU.  The hospitalist service was contacted to assume care when she transfers out of the ICU.    Stage IV laryngeal cancer  Airway obstruction  -Status post awake tracheostomy with direct laryngoscopy on August 9, 2020 by Dr. Magdaleno Meza.  -She tolerated the procedure well and transferred out of the ICU on 8/10/20.  -Postop management per ENT.  -Cuffed trach tube deflated by ENT on 8/11/20.  -SLP consulted.  -Palliative care consulted.  -Patient transitioned to comfort cares on 8/11/20.  -PRN medications available for shortness of breath/anxiety, however she has not been using them. Discussed role of these medications in managing her symptoms and she seems open to trying them. Will try current PRN medications, if no significant relief, will adjust medications until her symptoms are managed.  -Has difficulty swallowing. Suspect this is due to her cancer and will not improve now that she has transitioned to comfort cares.  -Hospice consulted.  -Plan for discharge home with hospice after appropriate arrangements are in place and symptoms are controlled.     Diet: Regular Diet Adult    DVT Prophylaxis: None, comfort cares  Wolfe Catheter: not present  Code Status: DNR/DNI/Comfort Cares         Disposition Plan   Expected discharge: home with hospice when arrangements in place  Entered: Juan Francisco Barker MD 08/12/2020, 11:02 AM       The patient's care was discussed with the Bedside Nurse, Patient and  Patient's Family (brother-in-law).    Juan Francisco Barker MD  Hospitalist Service  [facility]    ______________________________________________________________________    Interval History   Cherrie Crespo does not feel good this morning.  She is having difficulty swallowing and feels short of breath.  Her trach has been suctioned multiple times without significant improvement.  She met with palliative care yesterday afternoon and decided to transition to comfort care/hospice.  She has not used any as needed comfort medications since 11:00 last night.  No other complaints/concerns for me.  Denies fevers, pain, nausea, abdominal pain.  Her brother-in-law was in the room today.  Plan of care discussed.  Questions invited and answered.    Data reviewed today: I reviewed all medications, new labs and imaging results over the last 24 hours. I personally reviewed no images or EKG's today.    Physical Exam   Vital Signs: Temp: 98.6  F (37  C) Temp src: Oral BP: 112/69 Pulse: 81 Heart Rate: 67 Resp: 16 SpO2: 99 % O2 Device: Trach dome Oxygen Delivery: 25 LPM  Weight: 118 lbs 6.19 oz  Constitutional: awake, alert, cooperative, no apparent distress  HEENT: trach in place  Respiratory: coarse upper airway sounds  Cardiovascular: regular rate and rhythm, normal S1 and S2, no murmur noted  GI: normal bowel sounds, soft, non-distended, non-tender  Skin: warm, dry  Musculoskeletal: no lower extremity pitting edema present  Neurologic: awake, alert, oriented to name, place and time    Data   Recent Labs   Lab 08/11/20  0811 08/09/20  1229   WBC 6.0 3.1*   HGB 12.1 13.2   MCV 87 88    221   INR  --  1.00    137   POTASSIUM 3.8 3.9   CHLORIDE 109 106   CO2 24 28   BUN 18 11   CR 0.86 0.92   ANIONGAP 5 3   LOKESH 8.5 8.9   * 129*     Medications

## 2020-08-12 NOTE — PROGRESS NOTES
PIEDAD  D: Writer put in a referral to  hospice. Hospice to follow up with patient and relative. Informed by Tess  Hospice liaison that hospice could not provide 25LPM at home. Néstor and  discussed consulting bedside nurse about weaning. Tess also spoke with RN CC and was told tess would inquire what patient would like to do regarding weaning oxygen. Tess would folow up with RN CC or writer.      P: Would continue to follow     Addendum: Informed by Janelle from palliative that patient's high flow oxygen would be removed today. Per Janelle's note it is anticipated patient would pass in hospital.     FEDERICA Lazo     [facility]

## 2020-08-12 NOTE — PLAN OF CARE
VS and full assessment deferred d/t pt. On comfort cares. PRN dilaudid givenx1. Pt. Up SBA to the bathroom secondary to pt connected to trach humidity equipment.Trach in place and pt was suctioned a couple of times this shift.  Pt communicating with staff by witting on a piece of paper in the room. PIV SL. Continue to monitor.

## 2020-08-12 NOTE — CONSULTS
SW  D: Please see other SW note from today regarding hospice planning for patient.    FEDERICA Lazo     [facility]

## 2020-08-13 PROCEDURE — 99233 SBSQ HOSP IP/OBS HIGH 50: CPT | Performed by: NURSE PRACTITIONER

## 2020-08-13 PROCEDURE — 25000128 H RX IP 250 OP 636: Performed by: NURSE PRACTITIONER

## 2020-08-13 PROCEDURE — 25000132 ZZH RX MED GY IP 250 OP 250 PS 637: Performed by: NURSE PRACTITIONER

## 2020-08-13 PROCEDURE — 99232 SBSQ HOSP IP/OBS MODERATE 35: CPT | Performed by: HOSPITALIST

## 2020-08-13 PROCEDURE — 12000000 ZZH R&B MED SURG/OB

## 2020-08-13 PROCEDURE — 99207 ZZC CDG-MDM COMPONENT: MEETS MODERATE - UP CODED: CPT | Performed by: HOSPITALIST

## 2020-08-13 RX ADMIN — HYDROMORPHONE HYDROCHLORIDE 2 MG: 10 INJECTION, SOLUTION INTRAMUSCULAR; INTRAVENOUS; SUBCUTANEOUS at 23:06

## 2020-08-13 RX ADMIN — HYDROMORPHONE HYDROCHLORIDE 0.5 MG: 1 INJECTION, SOLUTION INTRAMUSCULAR; INTRAVENOUS; SUBCUTANEOUS at 12:48

## 2020-08-13 RX ADMIN — HYDROMORPHONE HYDROCHLORIDE 2 MG: 10 INJECTION, SOLUTION INTRAMUSCULAR; INTRAVENOUS; SUBCUTANEOUS at 14:40

## 2020-08-13 RX ADMIN — HYDROMORPHONE HYDROCHLORIDE 2 MG: 10 INJECTION, SOLUTION INTRAMUSCULAR; INTRAVENOUS; SUBCUTANEOUS at 22:36

## 2020-08-13 RX ADMIN — HYDROMORPHONE HYDROCHLORIDE 2 MG: 10 INJECTION, SOLUTION INTRAMUSCULAR; INTRAVENOUS; SUBCUTANEOUS at 16:05

## 2020-08-13 ASSESSMENT — ACTIVITIES OF DAILY LIVING (ADL)
ADLS_ACUITY_SCORE: 13

## 2020-08-13 NOTE — PLAN OF CARE
[id number]: A&Ox4. Comfort cares. Spot checked O2 per pt request. Trach, speaking valve in. SOB after getting back from bathroom, called RT to come assess pt - switched to HF for humidification/comfort (see RT note). Slept most of 4 hours, ativan given x1 for anxiety after using restroom. Up assist of 1. Regular diet but only doing ice chips, pt feels like she has a harder time swallowing. Voiding small amounts.

## 2020-08-13 NOTE — PROGRESS NOTES
[facility]    Medicine Progress Note - Hospitalist Service       Date of Admission:  8/9/2020  Assessment & Plan   Cherrie Crespo is a 70-year-old female who presented to the ER with increasing shortness of breath and difficulty swallowing.  She had known laryngeal cancer and was recently started on radiation therapy.  ENT was consulted and she went to the operating room for a tracheostomy.  She was subsequently admitted to the ICU.  She did well in the ICU overnight and is stable for transfer out of the ICU.  The hospitalist service was contacted to assume care when she transfers out of the ICU.    Stage IV laryngeal cancer  Airway obstruction  -Status post awake tracheostomy with direct laryngoscopy on August 9, 2020 by Dr. Magdaleno Meza.  -She tolerated the procedure well and transferred out of the ICU on 8/10/20.  -Postop management per ENT.  -Cuffed trach tube deflated by ENT on 8/11/20.  -SLP consulted.  -Palliative care consulted.  -Patient transitioned to comfort cares on 8/11/20.  -PRN comfort care medications available.  -Has difficulty swallowing. Suspect this is due to her cancer and will not improve now that she has transitioned to comfort cares.  -Hospice consulted.  -Was on high flow oxygen, compassionately discontinued on 8/12/20. O2 sats in 90s on room air.  -She does not appear to be imminently going to pass away. Expect she will pass away in the next 1-2 weeks.  -Plan for discharge with hospice when appropriate arrangements are in place. Discussed with palliative care and social work this morning.     Diet: Regular Diet Adult    DVT Prophylaxis: None, comfort cares  Wolfe Catheter: not present  Code Status: DNR/DNI/comfort cares         Disposition Plan   Expected discharge: when safe discharge plan in place  Entered: Juan Francisco Barker MD 08/13/2020, 9:19 AM       The patient's care was discussed with the Bedside Nurse, Care Coordinator/, Patient and Palliative Care  Consultant.    Juan Francisco Barker MD  Hospitalist Service  [facility]    ______________________________________________________________________    Interval History   Cherrie Crespo feels OK this morning. Had anxiety and shortness of breath last night around 11 PM, improved after being given lorazepam and being put back on humidity. Denies fevers, chest pain, nausea, abdominal pain.    Data reviewed today: I reviewed all medications, new labs and imaging results over the last 24 hours. I personally reviewed no images or EKG's today.    Physical Exam   Vital Signs:         Heart Rate: 77 Resp: 15 SpO2: 98 % O2 Device: High Flow Nasal Cannula (HFNC)(to trach) Oxygen Delivery: 25 LPM  Weight: 118 lbs 6.19 oz  Constitutional: awake, alert, cooperative, no apparent distress  HEENT: trach in place  Respiratory: clear to auscultation bilaterally, coarse upper airway sounds  Cardiovascular: regular rate and rhythm, normal S1 and S2, no murmur noted  GI: normal bowel sounds, soft, non-distended, non-tender  Skin: warm, dry  Musculoskeletal: no lower extremity pitting edema present  Neurologic: awake, alert, oriented to name, place and time    Data   Recent Labs   Lab 08/11/20  0811 08/09/20  1229   WBC 6.0 3.1*   HGB 12.1 13.2   MCV 87 88    221   INR  --  1.00    137   POTASSIUM 3.8 3.9   CHLORIDE 109 106   CO2 24 28   BUN 18 11   CR 0.86 0.92   ANIONGAP 5 3   LOKESH 8.5 8.9   * 129*     Medications

## 2020-08-13 NOTE — PLAN OF CARE
A&O. Trach with 28% FiO2 at 6 liters for humidification. Able to speak softly with passy aria valve on, otherwise was writing to communicate. Regular diet, no appetite and did not eat.  Up with assist of 1 to commode. Denies pain. Dilaudid SL given for shortness of breath. Patient states she is ready to die and wants to be kept comfortable. RT suctioned trach x1, yellow secretions. Patient transferred to station 88 @ 4:15 p.m, brother in lawYevgeniy, updated about new room on 8th floor.

## 2020-08-13 NOTE — PLAN OF CARE
Patient on comfort cares. Denies pain. Ativan available for anxiety. Up to bathroom. Repositioned for comfort. Assist x1. Trach on high-flow for humidity. Will continue to monitor.

## 2020-08-13 NOTE — PROGRESS NOTES
RT paged by RN in regards pt in respiratory distress. Pt placed back on HFNC 25L and 28% FiO2 for heat and humidity. Speaking valve off. Cuff re-inflated. Moderate thick yellow secretions suctioned. Trach care done. FYI Pt does well on RA, SpO2 at 94%. RT to follow and monitor per MD order.    Jakub Elias, RT

## 2020-08-13 NOTE — PLAN OF CARE
A&O x 4. Comfort cares, vitals only per pt request. Stopped high flow O2 around 1300 today, pt resting comfortably after being given prn dilaudid and ativan. Pt needing encouragement to take comfort meds. Sleeping most of afternoon. O2 sats in mid 90s off O2, trache speaking valve in place. Assist of 1 to stand by. regular diet, but no appetite, ice chips for comfort. PIV saline locked, intermittent medications. Trache cares done this AM with RT. Voiding small amounts. No BM. Continue plan of care.

## 2020-08-13 NOTE — PLAN OF CARE
Arrived to the unit around 1630. A&Ox4. Formal assessments deferred due to comfort cares. Prn dilaudid and ativan available as needed. Trach has speaking valve attached. Continent of bowel and bladder. A1 pivot to bedside commode.

## 2020-08-13 NOTE — PROGRESS NOTES
SPIRITUAL HEALTH SERVICES Progress Note  [facility]    Follow-up visit with pt, per palliative consult.  Pt alert, with brother-in-law Yevgeniy, when I arrived.  Pt still hoping to get in touch with pt's , Marlon, and I was able to provide his phone number so they can contact him.  Per unit , plans are being made for pt to transition out of the hospital with hospice care.  Provided support and shared prayer with them.  SH team available per additional need or request.                                                                                                                                                 Aissatou Lujan M.A.  Staff   Pager [phone]  Phone [phone]
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
## 2020-08-13 NOTE — PROGRESS NOTES
"Patient is resting on high flow to trach at 20 LPM 30% for humidity/comfort. She has a productive and persistent cough. We attempted to use the PMV so that she could speak to a visitor but could not tolerate for more than a few minutes. She is willing to try it again later so that can speak. However, patient said multiple times: \"I am dying. Let me die.\" She does not tolerate suctioning well and began to cry. I assured her we will do everything to make her as comfortable as possible. RT will continue to follow for respiratory cares.     Karishma Coery, RT on 8/13/2020 at 11:15 AM   "

## 2020-08-13 NOTE — PROGRESS NOTES
"[facility]  Palliative Care Daily Progress Note       Recommendations & Counseling       Comfort measures only    Please offer opioids frequently to alleviate symptoms of air hunger/respiratory distress. Pt requests to be sleepy and comfortable    Hydromorphone 1-2 mg SL q 2 hours prn pain/dyspnea     Hydromorphone 0.3-0.5 mg IV q 15\" prn pain/dyspnea - use only if SL not effectively controlling symptoms as it makes patient dizzy    Lorazepam available as needed for anxiety/restlessness         Assessments          Cherrie Crespo is a 70-year-old female who presented to the ER with increasing shortness of breath and difficulty swallowing.  She had known laryngeal cancer and was recently started on radiation therapy.  ENT was consulted and she went to the operating room for a tracheostomy. She is now stable post op and requesting to discuss goals of care.    Today, the patient was seen for:  Cancer related symptom management    Prognosis, Goals, or Advance Care Planning was addressed today with: Yes.  Mood, coping, and/or meaning in the context of serious illness were addressed today: Yes.  Summary/Comments:    Checked in with Cherrie this afternoon. She again clearly states she wants to be comfortable and allowed to die. She is hoping it will happen soon. She has only used minimal amounts of opioids and I discussed this with her. She tells me she has never liked to take \"drugs\" and it is difficult to use them now. She tells me she was definitely feeling uncomfortable and SOB last evening. I strongly encouraged her to use the Hydromorphone to alleviate these symptoms. We chatted for a bit while the nurse administered a dose of SL dilaudid. After about 20 minutes Cherrie reported feeling better and was not experiencing any dizziness.     Thank you for involving us in the care of this patient and family. We will continue to follow. Please do not hesitate to contact me with questions or concerns or the " I have not seen a prior auth request.   on-call provider for our team if evening or weekend.    Nika TALAMANTES, LAMAR  Palliative Medicine  [phone] (team pager)  [phone] (after hours/weekends)    Attestation:  Total time on the floor involved in the patient's care: 45 minutes  Total time spent in counseling/care coordination: >50%            Interval History:     Chart review/discussion with unit or clinical team members:   Discussed Dr Barker, pt's bedside RN, and Tess hospice liaison    Per patient or family/caregivers today:  As above, pt reported resp distress overnight, humidification added for comfort     Key Palliative Symptoms:  # Pain severity the last 12 hours: none  # Dyspnea severity the last 12 hours: moderate  # Nausea severity the last 12 hours: none  # Anxiety severity the last 12 hours: none    Patient is on opioids: bowels not assessed today.           Review of Systems:     Besides above, ROS was reviewed and is unremarkable          Medications:     I have reviewed this patient's medication profile and medications during this hospitalization.    Noted meds:    Current Facility-Administered Medications Ordered in Epic   Medication Dose Route Frequency Last Rate Last Dose     atropine 1 % ophthalmic solution 1-2 drop  1-2 drop Sublingual Q1H PRN         carboxymethylcellulose PF (REFRESH PLUS) 0.5 % ophthalmic solution 1-2 drop  1-2 drop Both Eyes Q8H PRN         HYDROmorphone (DILAUDID) (HIGH CONC) oral solution 1-2 mg  1-2 mg Sublingual Q2H PRN   2 mg at 08/13/20 1440     HYDROmorphone (PF) (DILAUDID) injection 0.3-0.5 mg  0.3-0.5 mg Intravenous Q15 Min PRN   0.5 mg at 08/13/20 1248     LORazepam (ATIVAN) 2 MG/ML (HIGH CONC) solution 0.5 mg  0.5 mg Oral Q4H PRN         LORazepam (ATIVAN) injection 0.5 mg  0.5 mg Intravenous Q15 Min PRN   0.5 mg at 08/12/20 2300     naloxone (NARCAN) injection 0.1-0.4 mg  0.1-0.4 mg Intravenous Q2 Min PRN         ondansetron (ZOFRAN-ODT) ODT tab 4 mg  4 mg Oral Q6H PRN         Or     ondansetron (ZOFRAN) injection 4 mg  4 mg Intravenous Q6H PRN         phenol (CHLORASEPTIC) 1.4 % spray 1 mL  1 spray Mouth/Throat Q1H PRN   1 mL at 08/11/20 1934     No current [facility]-ordered outpatient medications on file.              Physical Exam:     Temp src: Oral       Resp: 24 SpO2: 98 % O2 Device: High Flow Nasal Cannula (HFNC)(to trach) Oxygen Delivery: 20 LPM   Vitals:    08/09/20 1700 08/10/20 0000 08/11/20 0600   Weight: 51.8 kg (114 lb 3.2 oz) 51.8 kg (114 lb 3.2 oz) 53.7 kg (118 lb 6.2 oz)     CONSTITUTIONAL: NAD, A&Ox3. Calm and cooperative.  HEENT: NCAT, MMM, trach present  CARDIOVASCULAR: exam deferred   RESPIRATORY: mildly labored work of breathing noted on 6L oxygen (for humidification purposes only) via trach dome  GASTROINTESTINAL: exam deferred  MUSCULOSKELETAL: Moving freely in bed   SKIN: Warm and intact. No concerning lesions or rashes on exposed skin surfaces   NEUROLOGIC: Appropriately responsive during interview  PSYCH: Affect congruent            Data Reviewed:     Reviewed recent pertinent imaging, comments:   n/a    Reviewed recent labs, comments:   No results found for this or any previous visit (from the past 24 hour(s)).

## 2020-08-14 PROCEDURE — 25000128 H RX IP 250 OP 636: Performed by: HOSPITALIST

## 2020-08-14 PROCEDURE — 25000128 H RX IP 250 OP 636: Performed by: NURSE PRACTITIONER

## 2020-08-14 PROCEDURE — 12000000 ZZH R&B MED SURG/OB

## 2020-08-14 PROCEDURE — 25000132 ZZH RX MED GY IP 250 OP 250 PS 637: Performed by: NURSE PRACTITIONER

## 2020-08-14 PROCEDURE — 99207 ZZC CDG-MDM COMPONENT: MEETS MODERATE - UP CODED: CPT | Performed by: HOSPITALIST

## 2020-08-14 PROCEDURE — 99232 SBSQ HOSP IP/OBS MODERATE 35: CPT | Performed by: HOSPITALIST

## 2020-08-14 RX ORDER — HYDROMORPHONE HYDROCHLORIDE 10 MG/ML
2-4 INJECTION INTRAMUSCULAR; INTRAVENOUS; SUBCUTANEOUS
Status: DISCONTINUED | OUTPATIENT
Start: 2020-08-14 | End: 2020-08-22

## 2020-08-14 RX ADMIN — HYDROMORPHONE HYDROCHLORIDE 0.5 MG: 1 INJECTION, SOLUTION INTRAMUSCULAR; INTRAVENOUS; SUBCUTANEOUS at 22:52

## 2020-08-14 RX ADMIN — ONDANSETRON 4 MG: 4 TABLET, ORALLY DISINTEGRATING ORAL at 14:11

## 2020-08-14 RX ADMIN — HYDROMORPHONE HYDROCHLORIDE 2 MG: 10 INJECTION, SOLUTION INTRAMUSCULAR; INTRAVENOUS; SUBCUTANEOUS at 03:14

## 2020-08-14 RX ADMIN — HYDROMORPHONE HYDROCHLORIDE 0.5 MG: 1 INJECTION, SOLUTION INTRAMUSCULAR; INTRAVENOUS; SUBCUTANEOUS at 19:54

## 2020-08-14 ASSESSMENT — ACTIVITIES OF DAILY LIVING (ADL)
ADLS_ACUITY_SCORE: 15
ADLS_ACUITY_SCORE: 13

## 2020-08-14 NOTE — PLAN OF CARE
A&Ox4. Vitals deferred, comfort care. Up assist of one, pivot to BSC. Continent and calls appropriately. No c/o pain or distress. SL Zofran given X1 for episode of yellow emesis. New L PIV placed, SL. Regular diet, no appetite. ENT will exchange trach this afternoon, supplies gathered and at the bedside. Continue to monitor.

## 2020-08-14 NOTE — PROVIDER NOTIFICATION
Patient requesting to remove oxygen tonight, seen also in previous notes. Per Dr. Anthony white to remove oxygen and provide medications for comfort.

## 2020-08-14 NOTE — PROGRESS NOTES
[facility]    Medicine Progress Note - Hospitalist Service       Date of Admission:  8/9/2020  Assessment & Plan   Cherrie Crespo is a 70-year-old female who presented to the ER with increasing shortness of breath and difficulty swallowing.  She had known laryngeal cancer and was recently started on radiation therapy.  ENT was consulted and she went to the operating room for a tracheostomy.  She was subsequently admitted to the ICU.  She did well in the ICU overnight and is stable for transfer out of the ICU.  The hospitalist service was contacted to assume care when she transfers out of the ICU.    Stage IV laryngeal cancer  Airway obstruction  -Status post awake tracheostomy with direct laryngoscopy on August 9, 2020 by Dr. Magdaleno Meza.  -She tolerated the procedure well and transferred out of the ICU on 8/10/20.  -Postop management per ENT.  -Cuffed trach tube deflated by ENT on 8/11/20.  -SLP consulted.  -Palliative care consulted.  -Patient transitioned to comfort cares on 8/11/20.  -PRN comfort care medications available.  -Has difficulty swallowing. Suspect this is due to her cancer and will not improve now that she has transitioned to comfort cares.  -Hospice consulted.  -Was on high flow oxygen, compassionately discontinued on 8/12/20. O2 sats OK on room air following discontinuation of high flow oxygen.  -Will likely pass away within the next week due to lack of oral intake/hydration.  -Options for discharge with hospice limited due to financial concerns/lack of 24 hour care. Considered [facility], however patient not interested. Will continue end of life care in the hospital.  -Discussed with patient, nursing, social work this morning.     Diet: Regular Diet Adult    DVT Prophylaxis: None, comfort cares  Wolfe Catheter: not present  Code Status: DNR/DNI/comfort cares         Disposition Plan   Expected discharge: expect that patient will pass away in the hospital in the near  future  Entered: Juan Francisco Barker MD 08/14/2020, 9:19 AM       The patient's care was discussed with the Bedside Nurse, Care Coordinator/ and Patient.    Juan Francisco Barker MD  Hospitalist Service  [facility]    ______________________________________________________________________    Interval History   Cherrie Crespo feels OK this morning. Has a cough. Symptoms well controlled. Currently denies fevers, chest pain, shortness of breath, nausea, abdominal pain. Not eating. Not drinking, but occasionally has some ice chips.    Data reviewed today: I reviewed all medications, new labs and imaging results over the last 24 hours. I personally reviewed no images or EKG's today.    Physical Exam   Vital Signs: Temp: 96.9  F (36.1  C) Temp src: Oral BP: 133/63   Heart Rate: 77 Resp: 16 SpO2: 96 % O2 Device: None (Room air)(Patient requested to remove O2 - given medications) Oxygen Delivery: 6 LPM  Weight: 118 lbs 6.19 oz  Constitutional: awake, alert, cooperative, no apparent distress  HEENT: trach  Respiratory: clear to auscultation bilaterally, no crackles or wheezing  Cardiovascular: regular rate and rhythm, normal S1 and S2, no murmur noted  GI: normal bowel sounds, soft, non-distended, non-tender  Skin: warm, dry  Musculoskeletal: no lower extremity pitting edema present  Neurologic: awake, alert, oriented to name, place and time    Data   Medications

## 2020-08-14 NOTE — PROGRESS NOTES
"ENT Progress note    Hx:  Pt breathing well, some voice with passe-aria valve. She has decided to stop treatment for her cancer and proceed with comfort cares only.     Exam:  /71 (BP Location: Right arm)   Pulse 81   Temp 95.3  F (35.2  C) (Oral)   Resp 16   Ht 1.6 m (5' 3\")   Wt 53.7 kg (118 lb 6.2 oz)   SpO2 95%   BMI 20.97 kg/m      Trach stoma healing well, #6 cuffed shiley removed and #6 cuffless placed. Large air leak, ventilating well. Weak voice    WBC   Date Value Ref Range Status   08/11/2020 6.0 4.0 - 11.0 10e9/L Final       Assessment/Plan:  Cont trach cares  May start full liquid diet and advance to soft. If she tolerates this it would be reasonable to try a regular diet.     Please call  [phone] for any questions/concerns.     "

## 2020-08-14 NOTE — PROGRESS NOTES
A&O. Up assist of 1 GB to pivot to BSC. Patient requested to remove trach dome, palliative MD aware, premedicated with hydromorphone and oxygen removed. Patient tolerating well, requiring 3 doses of hydromorphone throughout night. Family updated, brother visited and would like to be updated on any changes. Continent of bowel and bladder. Nursing will continue to keep comfortable.

## 2020-08-15 PROCEDURE — 99232 SBSQ HOSP IP/OBS MODERATE 35: CPT | Performed by: HOSPITALIST

## 2020-08-15 PROCEDURE — 12000000 ZZH R&B MED SURG/OB

## 2020-08-15 PROCEDURE — 25000128 H RX IP 250 OP 636: Performed by: NURSE PRACTITIONER

## 2020-08-15 PROCEDURE — 25000125 ZZHC RX 250: Performed by: HOSPITALIST

## 2020-08-15 PROCEDURE — 99207 ZZC CDG-MDM COMPONENT: MEETS MODERATE - UP CODED: CPT | Performed by: HOSPITALIST

## 2020-08-15 RX ORDER — GLYCOPYRROLATE 0.2 MG/ML
.2-.4 INJECTION, SOLUTION INTRAMUSCULAR; INTRAVENOUS EVERY 4 HOURS PRN
Status: DISCONTINUED | OUTPATIENT
Start: 2020-08-15 | End: 2020-08-25 | Stop reason: HOSPADM

## 2020-08-15 RX ADMIN — LORAZEPAM 0.5 MG: 2 INJECTION INTRAMUSCULAR; INTRAVENOUS at 21:36

## 2020-08-15 RX ADMIN — GLYCOPYRROLATE 0.2 MG: 0.2 INJECTION, SOLUTION INTRAMUSCULAR; INTRAVENOUS at 18:44

## 2020-08-15 RX ADMIN — LORAZEPAM 0.5 MG: 2 INJECTION INTRAMUSCULAR; INTRAVENOUS at 16:20

## 2020-08-15 ASSESSMENT — ACTIVITIES OF DAILY LIVING (ADL)
ADLS_ACUITY_SCORE: 15
ADLS_ACUITY_SCORE: 13
ADLS_ACUITY_SCORE: 15
ADLS_ACUITY_SCORE: 15
ADLS_ACUITY_SCORE: 13
ADLS_ACUITY_SCORE: 13

## 2020-08-15 NOTE — PROVIDER NOTIFICATION
MD Notification    Person notified: MD    Person Name:  Mj    Date/Time: 8/15/2020 @ 5:44 PM    Interaction: web based page    Purpose of Notification: Pt refusing SL atropine for secretions. Could we try Robinul?     Orders Received: orders recieved    Comments: Pt having increased secretions and states SL medications makes her nauseated.     '

## 2020-08-15 NOTE — PLAN OF CARE
A&Ox4, difficult to understand at times d/t trach. Comfort cares, vitals deferred. Denies pain. Continent, up A1 to BSC. Calls appropriately. PIV SL. Trach exchanged yesterday. Continue to keep comfortable, slept between cares.

## 2020-08-15 NOTE — PROGRESS NOTES
[facility]    Medicine Progress Note - Hospitalist Service       Date of Admission:  8/9/2020  Assessment & Plan   Cherrie Crespo is a 70-year-old female who presented to the ER with increasing shortness of breath and difficulty swallowing.  She had known laryngeal cancer and was recently started on radiation therapy.  ENT was consulted and she went to the operating room for a tracheostomy.  She was subsequently admitted to the ICU.  She did well in the ICU overnight and is stable for transfer out of the ICU.  The hospitalist service was contacted to assume care when she transfers out of the ICU.    Stage IV laryngeal cancer  Airway obstruction  -Status post awake tracheostomy with direct laryngoscopy on August 9, 2020 by Dr. Magdaleno Meza.  -She tolerated the procedure well and transferred out of the ICU on 8/10/20.  -Postop management per ENT.  -Cuffed trach tube deflated by ENT on 8/11/20.  -SLP consulted.  -Palliative care consulted.  -Patient transitioned to comfort cares on 8/11/20.  -PRN comfort care medications available.  -Has difficulty swallowing. Suspect this is due to her cancer and will not improve now that she has transitioned to comfort cares.  -Hospice consulted.  -Was on high flow oxygen, compassionately discontinued on 8/12/20. O2 sats OK on room air following discontinuation of high flow oxygen.  -Options for discharge with hospice limited due to financial concerns/lack of 24 hour care. Considered [facility], however patient not interested. Will continue end of life care in the hospital for now.  -Will likely pass away within the next week due to lack of oral intake/hydration, although exact timing is unclear.  -Discussed with patient and nursing this morning.     Diet: Regular Diet Adult    DVT Prophylaxis: none, comfort cares  Wolfe Catheter: not present  Code Status: DNR/DNI/comfort cares         Disposition Plan   Expected discharge: patient near end of life but exact  timing unclear, will continue care in the hospital today  Entered: Juan Francisco Barker MD 08/15/2020, 9:37 AM       The patient's care was discussed with the Bedside Nurse and Patient.    Juan Francisco Barker MD  Hospitalist Service  [facility]    ______________________________________________________________________    Interval History   Cherrie Crespo feels OK today. No pain. Denies fevers, chest pain, shortness of breath, nausea, abdominal pain. Not eating, has had a few sips of ginger ale and ice chips.    Data reviewed today: I reviewed all medications, new labs and imaging results over the last 24 hours. I personally reviewed no images or EKG's today.    Physical Exam   Vital Signs: Temp: 95.3  F (35.2  C) Temp src: Oral BP: 138/71   Heart Rate: 92 Resp: 16 SpO2: 95 % O2 Device: None (Room air)    Weight: 118 lbs 6.19 oz  Constitutional: awake, alert, cooperative, no apparent distress, laying in bed  ENT: trach  Respiratory: clear to auscultation bilaterally, no crackles or wheezing  Cardiovascular: regular rate and rhythm, normal S1 and S2, no murmur noted  GI: normal bowel sounds, soft, non-distended, non-tender  Skin: warm, dry  Musculoskeletal: no lower extremity pitting edema present  Neurologic: awake, alert, answers questions appropriately    Data   Recent Labs   Lab 08/11/20  0811 08/09/20  1229   WBC 6.0 3.1*   HGB 12.1 13.2   MCV 87 88    221   INR  --  1.00    137   POTASSIUM 3.8 3.9   CHLORIDE 109 106   CO2 24 28   BUN 18 11   CR 0.86 0.92   ANIONGAP 5 3   LOKESH 8.5 8.9   * 129*     Medications

## 2020-08-15 NOTE — PLAN OF CARE
Alert and oriented x4. Denies pain. Not eating or drinking much. Dressing changed to trach site. On comfort cares. Plan to continue to monitor today.

## 2020-08-15 NOTE — PLAN OF CARE
A&Ox4; slightly anxious. Vitals deferred, comfort care. 2-5/10 pain managed with PRN IV dilaudid; effecive. Pt stated SL dilaudid made her nauseated. Continent and calls appropriately. PIV SL  Regular diet, no appetite. ENT  exchanged trach this afternoon; pt tolerated well.

## 2020-08-16 PROCEDURE — 25000128 H RX IP 250 OP 636: Performed by: NURSE PRACTITIONER

## 2020-08-16 PROCEDURE — 40000275 ZZH STATISTIC RCP TIME EA 10 MIN

## 2020-08-16 PROCEDURE — 99232 SBSQ HOSP IP/OBS MODERATE 35: CPT | Performed by: STUDENT IN AN ORGANIZED HEALTH CARE EDUCATION/TRAINING PROGRAM

## 2020-08-16 PROCEDURE — 40000008 ZZH STATISTIC AIRWAY CARE

## 2020-08-16 PROCEDURE — 25000125 ZZHC RX 250: Performed by: STUDENT IN AN ORGANIZED HEALTH CARE EDUCATION/TRAINING PROGRAM

## 2020-08-16 PROCEDURE — 12000000 ZZH R&B MED SURG/OB

## 2020-08-16 RX ORDER — MORPHINE SULFATE 2 MG/ML
1-2 INJECTION, SOLUTION INTRAMUSCULAR; INTRAVENOUS
Status: DISCONTINUED | OUTPATIENT
Start: 2020-08-16 | End: 2020-08-19

## 2020-08-16 RX ADMIN — MORPHINE SULFATE 2 MG: 2 INJECTION, SOLUTION INTRAMUSCULAR; INTRAVENOUS at 15:31

## 2020-08-16 RX ADMIN — LORAZEPAM 0.5 MG: 2 INJECTION INTRAMUSCULAR; INTRAVENOUS at 15:18

## 2020-08-16 ASSESSMENT — ACTIVITIES OF DAILY LIVING (ADL)
ADLS_ACUITY_SCORE: 13
ADLS_ACUITY_SCORE: 15
ADLS_ACUITY_SCORE: 13
ADLS_ACUITY_SCORE: 15

## 2020-08-16 NOTE — PLAN OF CARE
A&Ox4. All VS deferred; Comfort cares. Denies pain. Anxiety this shift with increased secretions from trach and feeling SOB. PRN Ativan given x2; effective. Robinul given for the increased secretions; effective. Pt states she does not tolerate any oral intake including meds as they make her nauseated. Continent, up A1 to BSC. Calls appropriately.

## 2020-08-16 NOTE — PLAN OF CARE
On comfort cares. Alert and oriented. Denies pain. Consuming low amounts of liquids. Dressing changed to trach site per RT. Up with 1 to BSC.

## 2020-08-16 NOTE — PROGRESS NOTES
RT called to bedside to assess tracheostomy. Upon arrival it was noted that there were oozing secretions and blanchable redness around the trach site. Pt was suctioned down trach with a 14 Irish catheter for a small amount of creamy thick secretions. Trach cares were done. Opti foam was placed under the trach plate.   Will cont to monitor/follow.  8/16/2020  Christen Forte, RT

## 2020-08-16 NOTE — PROVIDER NOTIFICATION
MD Notification    Notified Person: MD    Notified Person Name: Dr. Matthews     Notification Date/Time: 3:01 PM    Notification Interaction: paged     Purpose of Notification: Pt. feeling air hungry, RT recommending morphine to help. PRN IV morphine order??    Orders Received: pending    Comments:

## 2020-08-16 NOTE — PROGRESS NOTES
[facility]    Medicine Progress Note - Hospitalist Service       Date of Admission:  8/9/2020  Assessment & Plan   Cherrie Crespo is a 70-year-old female who presented to the ER with increasing shortness of breath and difficulty swallowing.  She had known laryngeal cancer and was recently started on radiation therapy.  ENT was consulted and she went to the operating room for a tracheostomy.  She was subsequently admitted to the ICU.  She did well in the ICU overnight and is stable for transfer out of the ICU.  The hospitalist service was contacted to assume care when she transfers out of the ICU.    Stage IV laryngeal cancer  Airway obstruction  -Status post awake tracheostomy with direct laryngoscopy on August 9, 2020 by Dr. Magdaleno Meza.  -She tolerated the procedure well and transferred out of the ICU on 8/10/20.  -Postop management per ENT.  -Cuffed trach tube deflated by ENT on 8/11/20.  -SLP consulted.  -Palliative care consulted.  -Patient transitioned to comfort cares on 8/11/20.  -PRN comfort care medications available.  -Has difficulty swallowing. Suspect this is due to her cancer and will not improve now that she has transitioned to comfort cares.  -Hospice consulted.  -Was on high flow oxygen, compassionately discontinued on 8/12/20. O2 sats OK on room air following discontinuation of high flow oxygen.  -Options for discharge with hospice limited due to financial concerns/lack of 24 hour care. Considered [facility], however patient not interested. Will continue end of life care in the hospital for now.  -Will likely pass away within the next week due to lack of oral intake/hydration, although exact timing is unclear.  -Discussed with patient and nursing this morning.  -Morphine ordered for air-hunger     Diet: Regular Diet Adult    DVT Prophylaxis: none, comfort cares  Wolfe Catheter: not present  Code Status: DNR/DNI/comfort cares         Disposition Plan   Expected discharge:  patient near end of life but exact timing unclear, will continue care in the hospital today  Entered: Reanna Matthews MD 08/16/2020, 6:20 PM       The patient's care was discussed with the Bedside Nurse and Patient.    Reanna Matthews MD  Hospitalist Service  [facility]    ______________________________________________________________________    Interval History   Cherrie Crespo feels OK today. No pain. Denies fevers, chest pain, shortness of breath, nausea, abdominal pain. Not eating, but is feeling very thirsty and asks for some soda. I brought her some sprite this afternoon which she appreciated. No other concerns.     Data reviewed today: I reviewed all medications, new labs and imaging results over the last 24 hours. I personally reviewed no images or EKG's today.    Physical Exam   Vital Signs: Temp: 96.6  F (35.9  C) Temp src: Oral BP: (!) 142/83   Heart Rate: 69 Resp: 16 SpO2: 98 % O2 Device: None (Room air)    Weight: 118 lbs 6.19 oz  Constitutional: awake, alert, cooperative, no apparent distress, laying in bed  ENT: trach, with some thick, white secretions.   Respiratory: no increased work of breathing  Cardiovascular: regular rate and rhythm  Skin: no lesions noted   Musculoskeletal: no lower extremity pitting edema present  Neurologic: awake, alert, answers questions appropriately    Data   Recent Labs   Lab 08/11/20  0811   WBC 6.0   HGB 12.1   MCV 87         POTASSIUM 3.8   CHLORIDE 109   CO2 24   BUN 18   CR 0.86   ANIONGAP 5   LOKESH 8.5   *     Medications

## 2020-08-16 NOTE — PLAN OF CARE
A&Ox3, d/o time. VS deferred; comfort cares. Speech difficult to understand at times d/t trach. Denies pain. Congested cough d/t secretions, denies SOB. Pt wants to avoid SL meds d/t nausea. Requesting sponge bath later today. Continent, up A1 w/ pivot to BSC. Calls appropriately, slept between cares. Continue to keep comfortable.

## 2020-08-17 PROCEDURE — 40000275 ZZH STATISTIC RCP TIME EA 10 MIN

## 2020-08-17 PROCEDURE — 99232 SBSQ HOSP IP/OBS MODERATE 35: CPT | Performed by: STUDENT IN AN ORGANIZED HEALTH CARE EDUCATION/TRAINING PROGRAM

## 2020-08-17 PROCEDURE — 12000000 ZZH R&B MED SURG/OB

## 2020-08-17 PROCEDURE — 25000125 ZZHC RX 250: Performed by: STUDENT IN AN ORGANIZED HEALTH CARE EDUCATION/TRAINING PROGRAM

## 2020-08-17 PROCEDURE — 25000128 H RX IP 250 OP 636: Performed by: NURSE PRACTITIONER

## 2020-08-17 RX ORDER — LORAZEPAM 2 MG/ML
0.5 INJECTION INTRAMUSCULAR
Status: DISCONTINUED | OUTPATIENT
Start: 2020-08-17 | End: 2020-08-25 | Stop reason: HOSPADM

## 2020-08-17 RX ADMIN — MORPHINE SULFATE 1 MG: 2 INJECTION, SOLUTION INTRAMUSCULAR; INTRAVENOUS at 15:21

## 2020-08-17 RX ADMIN — MORPHINE SULFATE 1 MG: 2 INJECTION, SOLUTION INTRAMUSCULAR; INTRAVENOUS at 17:17

## 2020-08-17 RX ADMIN — LORAZEPAM 0.5 MG: 2 INJECTION INTRAMUSCULAR; INTRAVENOUS at 13:21

## 2020-08-17 ASSESSMENT — ACTIVITIES OF DAILY LIVING (ADL)
ADLS_ACUITY_SCORE: 15
ADLS_ACUITY_SCORE: 15
ADLS_ACUITY_SCORE: 16
ADLS_ACUITY_SCORE: 15

## 2020-08-17 NOTE — PLAN OF CARE
A&Ox3, d/o time. VS deferred; comfort cares. Denies pain. Ativan and Morphine given x1 for anxiety and air hunger; effective. Fan also provided.  Congested cough d/t secretions, denies SOB. Pt wants to avoid SL meds d/t nausea. Continent, up A1 w/ pivot to BSC. Calls appropriately, slept between cares. Continue to keep comfortable.

## 2020-08-17 NOTE — PROGRESS NOTES
[facility]    Medicine Progress Note - Hospitalist Service       Date of Admission:  8/9/2020  Assessment & Plan   Cherrie Crespo is a 70-year-old female who presented to the ER with increasing shortness of breath and difficulty swallowing.  She had known laryngeal cancer and was recently started on radiation therapy.  ENT was consulted and she went to the operating room for a tracheostomy.  She was subsequently admitted to the ICU.  She did well in the ICU overnight and is stable for transfer out of the ICU.  The hospitalist service was contacted to assume care when she transfers out of the ICU.    Stage IV laryngeal cancer  Airway obstruction  -Status post awake tracheostomy with direct laryngoscopy on August 9, 2020 by Dr. Magdaleno Meza.  -She tolerated the procedure well and transferred out of the ICU on 8/10/20.  -Postop management per ENT.  -Cuffed trach tube deflated by ENT on 8/11/20.  -SLP consulted.  -Palliative care consulted.  -Patient transitioned to comfort cares on 8/11/20.  -PRN comfort care medications available.  -Has difficulty swallowing. Suspect this is due to her cancer and will not improve now that she has transitioned to comfort cares.  -Hospice consulted.  -Was on high flow oxygen, compassionately discontinued on 8/12/20. O2 sats OK on room air following discontinuation of high flow oxygen.  -Options for discharge with hospice limited due to financial concerns/lack of 24 hour care. Considered [facility], however patient not interested. Will continue end of life care in the hospital for now.  -Will likely pass away within the next week due to lack of oral intake/hydration, although exact timing is unclear.  -Discussed with patient and nursing this morning.  -Morphine ordered for air-hunger    No changes to plan or medication today.   -Spiritual health services consulted      Diet: Regular Diet Adult    DVT Prophylaxis: none, comfort cares  Wolfe Catheter: not  "present  Code Status: DNR/DNI/comfort cares         Disposition Plan   Expected discharge: patient near end of life but exact timing unclear, will continue care in the hospital today  Entered: Reanna Matthews MD 08/17/2020, 6:10 PM       The patient's care was discussed with the Bedside Nurse and Patient.    Reanna Matthews MD  Hospitalist Service  [facility]    ______________________________________________________________________    Interval History   Ms. Crespo is quite anxious today.  She reports that she feels like she is having a hard time breathing.  Trach is causing her a lot of discomfort.  She becomes intermittently tearful when I am talking to her stating that she, \"just wants to die.\"     I reassured her that we would provide her any sort of medication to make her more comfortable. Given some morphine which she did report improved her sense of shortness of breath.  I sat with her at the bedside for about 30 to 45 minutes and we chatted.  She found this to be helpful in terms of her anxiety and was very thankful.    Data reviewed today: I reviewed all medications, new labs and imaging results over the last 24 hours. I personally reviewed no images or EKG's today.    Physical Exam   Vital Signs:          Resp: 18 SpO2: 98 % O2 Device: Other (Comments)(changed to RA; trach dome provoking anxiety; MD white'ed) Oxygen Delivery: 6 LPM  Weight: 118 lbs 6.19 oz  Constitutional: awake, alert, cooperative, no apparent distress, laying in bed  ENT: trach, with some thick, white secretions.   Respiratory: no increased work of breathing  Skin: no lesions noted   Neurologic: awake, alert, very anxious and tearful     Data   Recent Labs   Lab 08/11/20  0811   WBC 6.0   HGB 12.1   MCV 87         POTASSIUM 3.8   CHLORIDE 109   CO2 24   BUN 18   CR 0.86   ANIONGAP 5   LOKESH 8.5   *     Medications       "

## 2020-08-17 NOTE — PROGRESS NOTES
Respiratory dare note. sx'd pt with trach stoma. She likes the cool aerosol S/U. Heated aerosol is better.But will follow pt this week.stoma is healing well. Dressing changed, clean and dry.BMunRemedios

## 2020-08-17 NOTE — PLAN OF CARE
A&Ox3, d/o time. VS deferred for comfort cares. Denies pain. Congested cough with secretions, occasionally productive, denies SOB. Ativan and morphine available for anxiety/air hunger. Continent, A1 w/ pivot to BSC. Slept between cares, calls appropriately. Continue to keep comfortable.

## 2020-08-17 NOTE — PLAN OF CARE
Comfort care. Anxious at times with SOB. Ativan given x1 and morphine given x2. Better results with morphine. Trach humidification removed as it provoked anxiety; on room air now. Hospitalist aware and ok with this. Up to BSC with assist of 1. Continent of urine. Mepilex to coccyx changed. Taking fluids but no so solid food. Plan pending safe discharge plan. Continue to monitor.

## 2020-08-18 PROCEDURE — 99231 SBSQ HOSP IP/OBS SF/LOW 25: CPT | Performed by: STUDENT IN AN ORGANIZED HEALTH CARE EDUCATION/TRAINING PROGRAM

## 2020-08-18 PROCEDURE — 25000128 H RX IP 250 OP 636: Performed by: NURSE PRACTITIONER

## 2020-08-18 PROCEDURE — 99207 ZZC CDG-MDM COMPONENT: MEETS MODERATE - UP CODED: CPT | Performed by: STUDENT IN AN ORGANIZED HEALTH CARE EDUCATION/TRAINING PROGRAM

## 2020-08-18 PROCEDURE — 40000008 ZZH STATISTIC AIRWAY CARE

## 2020-08-18 PROCEDURE — 12000000 ZZH R&B MED SURG/OB

## 2020-08-18 PROCEDURE — 40000275 ZZH STATISTIC RCP TIME EA 10 MIN

## 2020-08-18 RX ADMIN — HYDROMORPHONE HYDROCHLORIDE 0.5 MG: 1 INJECTION, SOLUTION INTRAMUSCULAR; INTRAVENOUS; SUBCUTANEOUS at 16:53

## 2020-08-18 ASSESSMENT — ACTIVITIES OF DAILY LIVING (ADL)
ADLS_ACUITY_SCORE: 13
ADLS_ACUITY_SCORE: 15

## 2020-08-18 NOTE — PLAN OF CARE
A&Ox4, a little forgetful @ times. Comfort cares continue. Full assessment and VS deferred. Denied any pain on shift. Patient seemed very comfortable and well rested. Denied the need for any Morphine or Ativan on shift. RR 16-20 on shift. Frequent cough @ times when trying to talk. Trach with speaking valve in place. Scant secretions from trach. Changed trach dressing on shift. No need for suction overnight. Continent. Up to BSC with A1 and pivot. Protective mepilex in place on coccyx. PIV SL. Discharge pending placement.

## 2020-08-18 NOTE — PLAN OF CARE
A&Ox4; forgetful. Comfort cares; VS and assessment deferred. Reports generalized body aches however declines offer for pain medication. Trach in place. Up with A1 to BSC. IV SL. Discharge plan unclear at this time.     Addendum: IV dilaudid given x1 for dyspnea. Fan at bedside also offering relief.

## 2020-08-18 NOTE — PROGRESS NOTES
[facility]    Medicine Progress Note - Hospitalist Service       Date of Admission:  8/9/2020  Assessment & Plan   Cherrie Crespo is a 70-year-old female who presented to the ER with increasing shortness of breath and difficulty swallowing.  She had known laryngeal cancer and was recently started on radiation therapy.  ENT was consulted and she went to the operating room for a tracheostomy.  She was subsequently admitted to the ICU.  She did well in the ICU overnight and is stable for transfer out of the ICU.  The hospitalist service was contacted to assume care when she transfers out of the ICU.    Stage IV laryngeal cancer  Airway obstruction  -Status post awake tracheostomy with direct laryngoscopy on August 9, 2020 by Dr. Magdaleno Meza.  -She tolerated the procedure well and transferred out of the ICU on 8/10/20.  -Postop management per ENT.  -Cuffed trach tube deflated by ENT on 8/11/20.  -SLP consulted.  -Palliative care consulted.  -Patient transitioned to comfort cares on 8/11/20.  -PRN comfort care medications available.  -Has difficulty swallowing. Suspect this is due to her cancer and will not improve now that she has transitioned to comfort cares.  -Hospice consulted.  -Was on high flow oxygen, compassionately discontinued on 8/12/20. O2 sats OK on room air following discontinuation of high flow oxygen.  -Options for discharge with hospice limited due to financial concerns/lack of 24 hour care. Considered [facility], however patient not interested. Will continue end of life care in the hospital for now.  -Will likely pass away within the next week due to lack of oral intake/hydration, although exact timing is unclear.  -Discussed with patient and nursing this morning.  -Morphine ordered for air-hunger    No changes to plan or medication today. May need to readdress dispo plan and look into hospice facilities. Initially pt was on high flow oxygen and it was thought that she would  likely be unable to wean, however, she has been doing fairly well without O2 for the past few days; furthermore, she is drinking a bit of fluids. May need to look into placement options again.        Diet: Regular Diet Adult    DVT Prophylaxis: none, comfort cares  Wolfe Catheter: not present  Code Status: DNR/DNI/comfort cares         Disposition Plan   Expected discharge: patient near end of life but exact timing unclear, will continue care in the hospital today  Entered: Reanna Matthews MD 08/18/2020, 6:05 PM       The patient's care was discussed with the Bedside Nurse and Patient.    Reanna Matthews MD  Hospitalist Service  [facility]    ______________________________________________________________________    Interval History   Continued to be anxious overnight with subjective dyspnea. Given dilaudid and ativan with relief of symptoms. Spiritual health visiting.     Data reviewed today: I reviewed all medications, new labs and imaging results over the last 24 hours. I personally reviewed no images or EKG's today.    Physical Exam   Vital Signs:          Resp: 18 SpO2: 96 % O2 Device: None (Room air)(trach with passe aria valve in place)    Weight: 118 lbs 6.19 oz  Exam deferred  Data   No lab results found in last 7 days.  Medications

## 2020-08-18 NOTE — PROGRESS NOTES
"SPIRITUAL HEALTH SERVICES Progress Note  [facility]    Visit with pt, per request for support.  Pt talked about the last few days, since our last visit.  She is ready to die, and said she prays every day that \"it would be today.\"  Pt seems reluctant to leave the hospital, although she knows that she may have to discharge somewhere else (likely not back to her home).  Pt's own  has been to see her, for which expressed gratitude.  Provided reflective conversation, empathic support and prayer.  SH team is available for on-going support, per need or request.                                                                                                                                                 Aissatou Lujan M.A.  Staff   Pager [phone]  Phone [phone]      "

## 2020-08-19 PROCEDURE — 25000128 H RX IP 250 OP 636: Performed by: HOSPITALIST

## 2020-08-19 PROCEDURE — 99233 SBSQ HOSP IP/OBS HIGH 50: CPT | Performed by: NURSE PRACTITIONER

## 2020-08-19 PROCEDURE — 25000128 H RX IP 250 OP 636: Performed by: STUDENT IN AN ORGANIZED HEALTH CARE EDUCATION/TRAINING PROGRAM

## 2020-08-19 PROCEDURE — 25000125 ZZHC RX 250: Performed by: HOSPITALIST

## 2020-08-19 PROCEDURE — 25000128 H RX IP 250 OP 636: Performed by: NURSE PRACTITIONER

## 2020-08-19 PROCEDURE — 99232 SBSQ HOSP IP/OBS MODERATE 35: CPT | Performed by: HOSPITALIST

## 2020-08-19 PROCEDURE — 12000000 ZZH R&B MED SURG/OB

## 2020-08-19 PROCEDURE — 25000132 ZZH RX MED GY IP 250 OP 250 PS 637: Performed by: NURSE PRACTITIONER

## 2020-08-19 RX ORDER — HYDROMORPHONE HYDROCHLORIDE 1 MG/ML
.5-1 INJECTION, SOLUTION INTRAMUSCULAR; INTRAVENOUS; SUBCUTANEOUS
Status: DISCONTINUED | OUTPATIENT
Start: 2020-08-19 | End: 2020-08-25 | Stop reason: HOSPADM

## 2020-08-19 RX ADMIN — LORAZEPAM 0.5 MG: 2 INJECTION INTRAMUSCULAR; INTRAVENOUS at 11:16

## 2020-08-19 RX ADMIN — HYDROMORPHONE HYDROCHLORIDE 0.5 MG: 1 INJECTION, SOLUTION INTRAMUSCULAR; INTRAVENOUS; SUBCUTANEOUS at 10:56

## 2020-08-19 RX ADMIN — GLYCOPYRROLATE 0.4 MG: 0.2 INJECTION, SOLUTION INTRAMUSCULAR; INTRAVENOUS at 11:20

## 2020-08-19 RX ADMIN — ONDANSETRON 4 MG: 2 INJECTION INTRAMUSCULAR; INTRAVENOUS at 17:18

## 2020-08-19 RX ADMIN — HYDROMORPHONE HYDROCHLORIDE 0.5 MG: 1 INJECTION, SOLUTION INTRAMUSCULAR; INTRAVENOUS; SUBCUTANEOUS at 11:43

## 2020-08-19 ASSESSMENT — ACTIVITIES OF DAILY LIVING (ADL)
ADLS_ACUITY_SCORE: 15
ADLS_ACUITY_SCORE: 16
ADLS_ACUITY_SCORE: 15
ADLS_ACUITY_SCORE: 15
ADLS_ACUITY_SCORE: 16
ADLS_ACUITY_SCORE: 16

## 2020-08-19 NOTE — PROGRESS NOTES
"[facility]    Medicine Progress Note - Hospitalist Service       Date of Admission:  8/9/2020  Assessment & Plan   Cherrie Crespo is a 70-year-old female who presented to the ER with increasing shortness of breath and difficulty swallowing.  She had known laryngeal cancer and was recently started on radiation therapy.  ENT was consulted and she went to the operating room for a tracheostomy.  She was subsequently admitted to the ICU.  She did well in the ICU overnight and is stable for transfer out of the ICU.  The hospitalist service was contacted to assume care when she transfers out of the ICU.    Stage IV laryngeal cancer  Airway obstruction  -Status post awake tracheostomy with direct laryngoscopy on August 9, 2020 by Dr. Magdaleno Meza.  -She tolerated the procedure well and transferred out of the ICU on 8/10/20.  -Postop management per ENT.  -Cuffed trach tube deflated by ENT on 8/11/20.  -SLP consulted.  -Palliative care consulted.  -Patient transitioned to comfort cares on 8/11/20.  -PRN comfort care medications available.  -Has difficulty swallowing. Suspect this is due to her cancer;  No additional diagnostic evaluation as patient desired transition to comfort care, in fact she does not want to take in oral food/nutrition as she does not want to \"prolong her life\".  Patient taking water for comfor  .-Was on high flow oxygen, compassionately discontinued on 8/12/20 per her wishes to transition to comfort care..  -Options for discharge with hospice limited due to financial concerns/lack of 24 hour care. Considered [facility], however patient not interested. Will continue end of life care in the hospital for now.  SW/ consultation.  -Discussed with patient and nursing this morning.  -Morphine ordered for air-hunger has PRN Ativan.  -Supportive family.       Diet: Regular Diet Adult    DVT Prophylaxis: none, comfort cares  Wolfe Catheter: not present  Code Status: " DNR/DNI/comfort cares         Disposition Plan   Expected discharge: TBD once disposition established on Comfort Cares  May Walton MD  Hospitalist Service  [facility]    ______________________________________________________________________    Interval History   Patient is calm, she expresses her wishes to pass peacefully with comfort cares and does not want to prolong her life stating that is the reason why she does not want to eat and take in only water only.  She realizes that she has medications for anxiety and any respiratory distress.    Physical Exam   Vital Signs:          Resp: 16        Weight: 118 lbs 6.19 oz  Constitutional.  NAD, alert, calm, cooperative, appears comfortable.  Trach in place, verbalizes through speaking valve.

## 2020-08-19 NOTE — PLAN OF CARE
A&Ox4, a little forgetful @ times. Comfort cares continue. Full assessment and VS deferred. Denied any pain on shift. Patient seemed very comfortable and well rested. Denied the need for any Morphine or Ativan on shift. RR 16-20 on shift. Frequent cough @ times when trying to talk. Trach with speaking valve in place. Scant secretions from trach. No need for suction overnight. Continent. Up to BSC with A1 and pivot. Protective mepilex in place on coccyx. PIV SL. Discharge pending placement.

## 2020-08-19 NOTE — PLAN OF CARE
Pt alert and talkative this am.  Appeared comfortable.  States she is not taking any solid food, only sips of liquids to moisten her mouth.  Up to BSC with assistance.  Pt did have episode of respiratory distress this am.  Pt was suctioned via trach, given PRN IV dilaudid and ativan to get her over this episode.  This afternoon pt is resting and appears comfortable.

## 2020-08-19 NOTE — PROGRESS NOTES
"[facility]  Palliative Care Daily Progress Note       Recommendations & Counseling       Comfort measures only    Please offer opioids frequently to alleviate symptoms of air hunger/respiratory distress. Pt requests to be sleepy and comfortable    Hydromorphone 1-2 mg SL q 2 hours prn pain/dyspnea     Hydromorphone 0.5-1 mg IV q 15\" prn pain/dyspnea - use only if SL not effectively controlling symptoms as it makes patient dizzy    Lorazepam available as needed for anxiety/restlessness     Continue humidification to avoid thick secretions         Assessments          Cherrie Crespo is a 70-year-old female who presented to the ER with increasing shortness of breath and difficulty swallowing.  She had known laryngeal cancer and was recently started on radiation therapy.  ENT was consulted and she went to the operating room for a tracheostomy. She is now stable post op and requesting to discuss goals of care.    Today, the patient was seen for:  Cancer related, end of life symptom management    Prognosis, Goals, or Advance Care Planning was addressed today with: Yes.  Mood, coping, and/or meaning in the context of serious illness were addressed today: Yes.  Summary/Comments:    Checked in with Cherrie this morning. She had just experienced an acute episode of coughing and inability to breathe. RT was called and resumed humidification and pt's bedside RN administered Hydromorphone and Lorazepam with good relief of symptoms. Cherrie is resting comfortably during my visit, but still feels SOB. I recommended we administer a second dose of Hydromorphone and she was in agreement. This does cause some dizziness for Cherrie, but she states it is tolerable.     Thank you for involving us in the care of this patient and family. We will continue to follow. Please do not hesitate to contact me with questions or concerns or the on-call provider for our team if evening or weekend.    Nika TALAMANTES, CNP  Palliative " Medicine  [phone] (team pager)  [phone] (after hours/weekends)    Attestation:  Total time on the floor involved in the patient's care: 35 minutes  Total time spent in counseling/care coordination: >50%            Interval History:     Chart review/discussion with unit or clinical team members:   Discussed pt's bedside JOSIE Newman    Per patient or family/caregivers today:  Pt seen resting in bed, her brother in law Yevgeniy at bedside. Cherrie appears more frail/weak today that she did last week.     Key Palliative Symptoms:  # Pain severity the last 12 hours: none  # Dyspnea severity the last 12 hours: severe  # Nausea severity the last 12 hours: none  # Anxiety severity the last 12 hours: none    Patient is on opioids: bowels not assessed today.           Review of Systems:     Besides above, ROS was reviewed and is unremarkable          Medications:     I have reviewed this patient's medication profile and medications during this hospitalization.    Noted meds:    Current Facility-Administered Medications Ordered in Epic   Medication Dose Route Frequency Last Rate Last Dose     atropine 1 % ophthalmic solution 1-2 drop  1-2 drop Sublingual Q1H PRN         carboxymethylcellulose PF (REFRESH PLUS) 0.5 % ophthalmic solution 1-2 drop  1-2 drop Both Eyes Q8H PRN         glycopyrrolate (ROBINUL) injection 0.2-0.4 mg  0.2-0.4 mg Intravenous Q4H PRN   0.4 mg at 08/19/20 1120     HYDROmorphone (DILAUDID) (HIGH CONC) oral solution 2-4 mg  2-4 mg Sublingual Q2H PRN         HYDROmorphone (PF) (DILAUDID) injection 0.5-1 mg  0.5-1 mg Intravenous Q15 Min PRN   0.5 mg at 08/19/20 1143     LORazepam (ATIVAN) 2 MG/ML (HIGH CONC) solution 0.5 mg  0.5 mg Oral Q4H PRN   0.5 mg at 08/19/20 1116     LORazepam (ATIVAN) injection 0.5 mg  0.5 mg Intravenous Q2H PRN         naloxone (NARCAN) injection 0.1-0.4 mg  0.1-0.4 mg Intravenous Q2 Min PRN         ondansetron (ZOFRAN-ODT) ODT tab 4 mg  4 mg Oral Q6H PRN   4 mg at  08/14/20 1411    Or     ondansetron (ZOFRAN) injection 4 mg  4 mg Intravenous Q6H PRN         phenol (CHLORASEPTIC) 1.4 % spray 1 mL  1 spray Mouth/Throat Q1H PRN   1 mL at 08/11/20 1934     No current [facility]-ordered outpatient medications on file.              Physical Exam:            Resp: 16         Vitals:    08/09/20 1700 08/10/20 0000 08/11/20 0600   Weight: 51.8 kg (114 lb 3.2 oz) 51.8 kg (114 lb 3.2 oz) 53.7 kg (118 lb 6.2 oz)     CONSTITUTIONAL: NAD, A&Ox3. Calm and cooperative.  HEENT: NCAT, MMM, trach present  CARDIOVASCULAR: exam deferred   RESPIRATORY: mildly labored work of breathing noted, intermittent cough  GASTROINTESTINAL: exam deferred  MUSCULOSKELETAL: Moving freely in bed   SKIN: Warm and intact. No concerning lesions or rashes on exposed skin surfaces   NEUROLOGIC: Appropriately responsive during interview  PSYCH: Affect congruent            Data Reviewed:     Reviewed recent pertinent imaging, comments:   n/a    Reviewed recent labs, comments:   No results found for this or any previous visit (from the past 24 hour(s)).

## 2020-08-20 PROCEDURE — 99232 SBSQ HOSP IP/OBS MODERATE 35: CPT | Performed by: HOSPITALIST

## 2020-08-20 PROCEDURE — 12000000 ZZH R&B MED SURG/OB

## 2020-08-20 PROCEDURE — 25000128 H RX IP 250 OP 636: Performed by: NURSE PRACTITIONER

## 2020-08-20 RX ADMIN — HYDROMORPHONE HYDROCHLORIDE 0.5 MG: 1 INJECTION, SOLUTION INTRAMUSCULAR; INTRAVENOUS; SUBCUTANEOUS at 12:23

## 2020-08-20 RX ADMIN — HYDROMORPHONE HYDROCHLORIDE 0.5 MG: 1 INJECTION, SOLUTION INTRAMUSCULAR; INTRAVENOUS; SUBCUTANEOUS at 18:59

## 2020-08-20 ASSESSMENT — ACTIVITIES OF DAILY LIVING (ADL)
ADLS_ACUITY_SCORE: 15

## 2020-08-20 NOTE — PLAN OF CARE
"Patient is A/ox4. Comfort care since 8/11. VS and formal assessment deferred. Denied pain, nervousness, or anxiety - no PRNs needed this shift. Denied shortness of breath or air hunger - no PRNs needed this shift. Breathing pattern regular. Small, green emesis after taking small sip of water this evening, PRN IV Zofran given x1. Patient stated \"I just want to die\" following emesis. Trach in place with speaking valve, no secretions noted this shift. Continent of bladder. Patient removed mepilex to coccyx for PIP. Up with A1 to SBC. Plan to continue with comfort measures. Nursing to continue to monitor.  "

## 2020-08-20 NOTE — PLAN OF CARE
"Patient A&Ox4, pleasant. A little forgetful. Comfort cares continue. VS and full assessment deferred. Denied any pain or anxiety or air hunger. Breathing well on shift, no episodes of any SOB. Early in AM around 0615, checked in on patient and patient stated she was having a hard time breathing, but didn't want anything for it. Noted RR to be 18. No signs of being in acute distress. Patient refused the need for suction and refused the need for any PRN's. Patient stated, \"hopefully I'll go today.\" Reinforced and educated patient on the PRN meds that are available. RR 16-20. No c/o nausea on shift. Trach in place with speaking valve, minimal secretions on shift. No suctioning needed overnight. Continent, up to BSC with A1 and pivot. Mepilex applied to coccyx for protection. No breakdown noted on cocccyx; reddened blanchable. Plan to continue with comfort measures. Discharge pending; SW involved.   "

## 2020-08-20 NOTE — PROGRESS NOTES
"[facility]    Medicine Progress Note - Hospitalist Service       Date of Admission:  8/9/2020  Assessment & Plan   Cherrie Crespo is a 70-year-old female who presented to the ER with increasing shortness of breath and difficulty swallowing.  She had known laryngeal cancer and was recently started on radiation therapy.  ENT was consulted and she went to the operating room for a tracheostomy.  She was subsequently admitted to the ICU.  She did well in the ICU overnight and is stable for transfer out of the ICU.  The hospitalist service was contacted to assume care when she transfers out of the ICU.    Stage IV laryngeal cancer  Airway obstruction  -Status post tracheostomy with direct laryngoscopy on August 9, 2020 by Dr. Magdaleno Meza.  Now with speaking valve.  -Cuffed trach tube deflated by ENT on 8/11/20.  -SLP consulted.  -Palliative care consulted.  -Patient transitioned to comfort cares on 8/11/20.  -PRN comfort care medications available.  -Has difficulty swallowing. Suspect this is due to her cancer;  No additional diagnostic evaluation as patient desired transition to comfort care, in fact she does not want to take in oral food/nutrition as she does not want to \"prolong her life\".  Patient taking water for comfort  .-Was on high flow oxygen, compassionately discontinued on 8/12/20 per her wishes to transition to comfort care..  -Options for discharge with hospice limited due to financial concerns/lack of 24 hour care. Considered [facility], however patient not interested on previous discussions.  Long discussion with  regarding options for Comfort Care on discharge as it does not appear that passing is eminent in hospital now that trach is in place to manage airway.  .-Morphine ordered for air-hunger has PRN Ativan.  -Supportive family.       Diet: Regular Diet Adult    DVT Prophylaxis: none, comfort cares  Wolfe Catheter: not present  Code Status: DNR/DNI/comfort cares     "     Disposition Plan   Expected discharge: TBD once disposition established on Comfort Cares  May Walton MD  Hospitalist Service  [facility]    ______________________________________________________________________    Interval History   Patient is calm, she expresses her wishes to pass peacefully with comfort cares and does not want to prolong her life stating that is the reason why she does not want to eat and take in only water only.  She realizes that she has medications for anxiety and any respiratory distress.    Physical Exam   Vital Signs:          Resp: 18        Weight: 118 lbs 6.19 oz  Constitutional.  NAD, alert, calm, cooperative, appears comfortable.  Trach in place, verbalizes through speaking valve.

## 2020-08-21 PROCEDURE — 99232 SBSQ HOSP IP/OBS MODERATE 35: CPT | Performed by: HOSPITALIST

## 2020-08-21 PROCEDURE — 25000125 ZZHC RX 250: Performed by: HOSPITALIST

## 2020-08-21 PROCEDURE — 12000000 ZZH R&B MED SURG/OB

## 2020-08-21 PROCEDURE — 25000128 H RX IP 250 OP 636: Performed by: STUDENT IN AN ORGANIZED HEALTH CARE EDUCATION/TRAINING PROGRAM

## 2020-08-21 PROCEDURE — 25000132 ZZH RX MED GY IP 250 OP 250 PS 637: Performed by: NURSE PRACTITIONER

## 2020-08-21 PROCEDURE — 25000128 H RX IP 250 OP 636: Performed by: NURSE PRACTITIONER

## 2020-08-21 RX ADMIN — HYDROMORPHONE HYDROCHLORIDE 0.5 MG: 1 INJECTION, SOLUTION INTRAMUSCULAR; INTRAVENOUS; SUBCUTANEOUS at 20:24

## 2020-08-21 RX ADMIN — GLYCOPYRROLATE 0.4 MG: 0.2 INJECTION, SOLUTION INTRAMUSCULAR; INTRAVENOUS at 20:24

## 2020-08-21 RX ADMIN — Medication 0.5 MG: at 11:39

## 2020-08-21 ASSESSMENT — ACTIVITIES OF DAILY LIVING (ADL)
ADLS_ACUITY_SCORE: 15
ADLS_ACUITY_SCORE: 14
ADLS_ACUITY_SCORE: 15
ADLS_ACUITY_SCORE: 15

## 2020-08-21 NOTE — PLAN OF CARE
Summary:       DATE & TIME: 8/21/20    Cognitive Concerns/ Orientation : A&Ox4    BEHAVIOR & AGGRESSION TOOL COLOR: green, calm appreciative    CIWA SCORE: NA    ABNL VS/O2: No VS, comfort care    MOBILITY: A1 pivot to commode    PAIN MANAGMENT: denies    DIET: comfort care    BOWEL/BLADDER: continent    ABNL LAB/BG:     DRAIN/DEVICES: No IV access    TELEMETRY RHYTHM: NA    SKIN: bruised    TESTS/PROCEDURES:     D/C DAY/GOALS/PLACE: SW worker working on discharge requirements, possibly to a TCU facility    OTHER IMPORTANT INFO:

## 2020-08-21 NOTE — PROGRESS NOTES
"[facility]    Medicine Progress Note - Hospitalist Service       Date of Admission:  8/9/2020  Assessment & Plan   Cherrie Crespo is a 70-year-old female who presented to the ER with increasing shortness of breath and difficulty swallowing.  She had known laryngeal cancer and was recently started on radiation therapy.  ENT was consulted and she went to the operating room for a tracheostomy.  She was subsequently admitted to the ICU.  She did well in the ICU overnight and is stable for transfer out of the ICU.  The hospitalist service was contacted to assume care when she transfers out of the ICU.    Stage IV laryngeal cancer  Airway obstruction  -Status post tracheostomy with direct laryngoscopy on August 9, 2020 by Dr. Magdaleno Meza.  Now with speaking valve.  -Cuffed trach tube deflated by ENT on 8/11/20.  Now speaking valve.  -SLP consulted.  -Palliative care consulted.  -Patient transitioned to comfort cares on 8/11/20.  -PRN comfort care medications available.  -Has difficulty swallowing. Suspect this is due to her cancer;  No additional diagnostic evaluation as patient desired transition to comfort care, in fact she does not want to take in oral food/nutrition as she does not want to \"prolong her life\".  Patient taking water for comfort  .-Was on high flow oxygen, compassionately discontinued on 8/12/20 per her wishes to transition to comfort care..  -Options for discharge with hospice limited due to financial concerns/lack of 24 hour care. Considered [facility], however patient was not interested on previous discussions.  Long discussion with  regarding options for Comfort Care on discharge as it does not appear that passing is eminent in hospital now that trach is in place to manage airway.  .-Morphine ordered for air-hunger has PRN Ativan.  -Long discussion with patient and her brother-in-law around today regarding options for comfort care recognizing what is important to " patient including her desires to minimize any financial burdens.  Recommended discussion with SW regarding options including [facility] hospice or insurance/financial issues for LTC option with hospice.  -Discussed with Patient and her brother-in-law Yevgeniy regarding advisability of local suctioning to allow communication through speaking valve, patient does not want deep suctioning due to discomfort.       Diet: Regular Diet Adult    DVT Prophylaxis: none, comfort cares  Wolfe Catheter: not present  Code Status: DNR/DNI/comfort cares         Disposition Plan   Expected discharge: TBD once disposition established on Comfort Cares  May Walton MD  Hospitalist Service  [facility]    ______________________________________________________________________    Interval History   Patient in more distress today due to secretions affecting ability to communicate per speaking valve.  Brother-in-law, Yevgeniy was present and stated that patient does not like to have deep suctioning however understands the rationale for suctioning around the speaking valve.  Discussion today regarding patient's wishes specifically disposition where she does not want this to result in any financial burden.  Discussed options regarding [facility] hospice and recommendations to have discussion with SW regarding other options including LTC with hospice.  Patient receptive to plan.  Otherwise she states she is comfortable, some issues with swallowing again feeling this represents buildup of secretions with patient declining local suction.    Physical Exam   Vital Signs:          Resp: 18        Weight: 118 lbs 6.19 oz  Constitutional.  NAD, alert, Appears comfortable however noticeable thick secretions through speaking valve affecting ability to communicate.  No increased secretions orally.

## 2020-08-21 NOTE — PLAN OF CARE
"A&Ox4. VS and full assessment deferred due to comfort cares. IV leaking - removed. SL Ativan given x1; effective. Pt hates the taste and stated \" Never again\". Pt sad and tearful today. Plan to discharge with hospice once placement is found.   "

## 2020-08-21 NOTE — PLAN OF CARE
A/ox4. VSS and full assessment deferred, comfort cares. Denied pain/SOB. Trach in place. Up to BSC with assist of 1. Discharge pending, social work following.

## 2020-08-21 NOTE — PLAN OF CARE
Patient A&Ox4, pleasant. Comfort cares continue. VS and full assessment deferred. PRN Dilaudid given x2 for pain; effective. Trach in place. Discharge pending.

## 2020-08-21 NOTE — CONSULTS
"SW Consult Note:    D/I:  SW was consulted to assist with discharge planning for patient.  PIEDAD met with patient and brother in law Chinmay to discuss discharge planning.  Patient stated that she is not able to discharge to home or with family and does not have the resources to have private duty care in the home.  SW discussed resources for applying for Medical assistance and patient stated that she does not have the money to pay for LTC but would not qualify for MA either.  SW discussed [facility] and patient in agreement with referral being sent to facility.  SW sent referral and spoke with Lashell at [facility].  They would know about bed availability on Monday.  PIEDAD paged physician to sign admission referral so we can fax information to facility.      Addendum:  SW met with patient regarding paying for long term care facilities and also filling out MA application so that she would be able pay with her assets until she reaches the guidelines and then medical assistance would take over payment.  Patient had a friend visiting who said that she had called [facility] and spoke with Jaylen who told her that they have three beds available and that they \"do not turn away any patients\" so they should be able to take her. SW sent referral to [facility] and spoke with Verito about the referral.  She will pass along to Jaylen/Chinmay/Betzy for review.     P: PIEDAD will continue to assist and follow for discharge.    JOHNNIE Wiley, LGSW  [phone]  [facility]           "

## 2020-08-22 PROCEDURE — 25000132 ZZH RX MED GY IP 250 OP 250 PS 637: Performed by: HOSPITALIST

## 2020-08-22 PROCEDURE — 99232 SBSQ HOSP IP/OBS MODERATE 35: CPT | Performed by: HOSPITALIST

## 2020-08-22 PROCEDURE — 12000000 ZZH R&B MED SURG/OB

## 2020-08-22 PROCEDURE — 25000132 ZZH RX MED GY IP 250 OP 250 PS 637: Performed by: NURSE PRACTITIONER

## 2020-08-22 PROCEDURE — 25000128 H RX IP 250 OP 636: Performed by: NURSE PRACTITIONER

## 2020-08-22 RX ORDER — MORPHINE SULFATE 100 MG/5ML
10-20 SOLUTION ORAL
Status: DISCONTINUED | OUTPATIENT
Start: 2020-08-22 | End: 2020-08-25 | Stop reason: HOSPADM

## 2020-08-22 RX ADMIN — HYDROMORPHONE HYDROCHLORIDE 2 MG: 10 INJECTION, SOLUTION INTRAMUSCULAR; INTRAVENOUS; SUBCUTANEOUS at 18:37

## 2020-08-22 RX ADMIN — Medication 1 SPRAY: at 20:06

## 2020-08-22 RX ADMIN — MORPHINE SULFATE 10 MG: 100 SOLUTION ORAL at 21:07

## 2020-08-22 RX ADMIN — HYDROMORPHONE HYDROCHLORIDE 0.5 MG: 1 INJECTION, SOLUTION INTRAMUSCULAR; INTRAVENOUS; SUBCUTANEOUS at 08:02

## 2020-08-22 ASSESSMENT — ACTIVITIES OF DAILY LIVING (ADL)
ADLS_ACUITY_SCORE: 14

## 2020-08-22 NOTE — PLAN OF CARE
VS deferred per comfort cares. A&O x4. Up with SBA to BSC. Denies pain. Resting well overnight. Repositions self in bed, refuses frequent positioning. Trach patent, no suctioning needed. Plan to discharge to hospice facility when placement found

## 2020-08-22 NOTE — PROGRESS NOTES
"[facility]    Medicine Progress Note - Hospitalist Service       Date of Admission:  8/9/2020  Assessment & Plan   Cherrie Crespo is a 70-year-old female who presented to the ER with increasing shortness of breath and difficulty swallowing.  She had known laryngeal cancer and was recently started on radiation therapy.  ENT was consulted and she went to the operating room for a tracheostomy.  She was subsequently admitted to the ICU.  She did well in the ICU overnight and is stable for transfer out of the ICU.  The hospitalist service was contacted to assume care when she transfers out of the ICU.    Stage IV laryngeal cancer  Airway obstruction  -Status post tracheostomy with direct laryngoscopy on August 9, 2020 by Dr. Magdaleno Meza.  Now with speaking valve.  -Cuffed trach tube deflated by ENT on 8/11/20.  Now speaking valve.  -SLP consulted.  -Palliative care consulted.  -Patient transitioned to comfort cares on 8/11/20.  -PRN comfort care medications available.  -Has difficulty swallowing. Suspect this is due to her cancer;  No additional diagnostic evaluation as patient desired transition to comfort care, in fact she does not want to take in oral food/nutrition as she does not want to \"prolong her life\".  Patient taking water for comfort  .-Was on high flow oxygen, compassionately discontinued on 8/12/20 per her wishes to transition to comfort care..  -Discussion yesterday with Patient, Brother-in-law, JOHNNIE Vuong regarding hospice options, currently evaluating [facility] and [facility].  Nursing reports poor IV access, discussed with patient sublingual route, patient was adamant that that causes \"difficulty to breathe\".  It appears volume is between 0.2 and 0.25 mL's, consistent with what patient takes in per water/ice chips however patient became quite agitated on pursuing sublingual route.  At this time we will discussed with Palliative Care regarding options available with current " "poor IV access and limitation of IV access on transition to Hospice     Diet: Regular Diet Adult    DVT Prophylaxis: none, comfort cares  Wolfe Catheter: not present  Code Status: DNR/DNI/comfort cares         Disposition Plan   Expected discharge: TBD once Hospice disposition established.May Walton MD  Hospitalist Service  [facility]    ______________________________________________________________________    Interval History   Patient appears more calm today after disposition discussed with SW, herself and brother-in-law yesterday with the decision to proceed with hospice placement, currently being evaluated by [facility] and [facility].  Decrease in thick secretions around speaking valve.  Nursing reports poor IV access, discussed with patient sublingual route, patient was insistent that she cannot \"swallow and cannot breathe' with oral/sublingual medications.      Physical Exam   Vital Signs:          Resp: 18        Weight: 118 lbs 6.19 oz  Constitutional.  NAD, alert, Appears comfortable, affect brighter today, marked decrease in secretions around speaking valve  "

## 2020-08-22 NOTE — PROGRESS NOTES
PIEDAD    D: SW following for placement. PIEDAD followed up on referral to [facility] and spoke with Yoana who informs possible beds available but patient will need to have COVID-19 test as her last one was not within 72 hours. PIEDAD paged MD requesting Covid-19 test.     P: Will continue to follow.

## 2020-08-22 NOTE — PROVIDER NOTIFICATION
MD Notification    Notified Person: MD    Notified Person Name:  Alan    Notification Date/Time: 08/22/20 1030    Notification Interaction: Phone    Purpose of Notification: Pain plan since IV access was lost, patient says she doesn't like the sublingual options.    Orders Received: None    Comments: Continue to encourage patient to use SL PRN options, that is what she will have available when she discharges.

## 2020-08-22 NOTE — PLAN OF CARE
A/ox4. VSS and full assessment deferred, comfort care status. Had SOB at beginning of shift. Suctioned x1, gave prn IV dilaudid x1 and PRN robinul x1, both effective. Up SBA to BSC-bed alarm on. SW following, referrals sent to [facility] and [facility].

## 2020-08-22 NOTE — PROVIDER NOTIFICATION
MD Notification    Notified Person: MD    Notified Person Name: Dr. Walton    Notification Date/Time: 08/22/20 1600    Notification Interaction: Paged, phone    Purpose of Notification: Social work is requesting a repeat asymptomatic covid test for discharge.    Orders Received:    Comments: MD will enter order shortly.

## 2020-08-23 LAB
LABORATORY COMMENT REPORT: NORMAL
SARS-COV-2 RNA SPEC QL NAA+PROBE: NEGATIVE
SARS-COV-2 RNA SPEC QL NAA+PROBE: NORMAL
SPECIMEN SOURCE: NORMAL
SPECIMEN SOURCE: NORMAL

## 2020-08-23 PROCEDURE — U0003 INFECTIOUS AGENT DETECTION BY NUCLEIC ACID (DNA OR RNA); SEVERE ACUTE RESPIRATORY SYNDROME CORONAVIRUS 2 (SARS-COV-2) (CORONAVIRUS DISEASE [COVID-19]), AMPLIFIED PROBE TECHNIQUE, MAKING USE OF HIGH THROUGHPUT TECHNOLOGIES AS DESCRIBED BY CMS-2020-01-R: HCPCS | Performed by: HOSPITALIST

## 2020-08-23 PROCEDURE — 12000000 ZZH R&B MED SURG/OB

## 2020-08-23 PROCEDURE — 99232 SBSQ HOSP IP/OBS MODERATE 35: CPT | Performed by: HOSPITALIST

## 2020-08-23 ASSESSMENT — ACTIVITIES OF DAILY LIVING (ADL)
ADLS_ACUITY_SCORE: 13
ADLS_ACUITY_SCORE: 14
ADLS_ACUITY_SCORE: 13

## 2020-08-23 NOTE — PROVIDER NOTIFICATION
"MD Notification    Notified Person: MD    Notified Person Name:  Ed    Notification Date/Time: 08/23/20 6:40 PM     Notification Interaction: Phone    Purpose of Notification: Pain, patient refusing all oral medications.  Also says she does not want any rectal meds.  Patient says she will \"ride it out.\"    Orders Received: No changes at this time.    Comments:  Uncomfortable prescribing pain patch without assessing patient, rounder tomorrow may consider prescribing a pain patch.  Continue to encourage oral prn pain options.  "

## 2020-08-23 NOTE — PLAN OF CARE
Comfort cares. A&Ox4. Up with SBA to BSC. Repositions self in bed, refused repositioning from staff. marina [place] suction used x1, refused deep suctioning. Lost IV access today, MD aware.  SL pain medications available, patient says that she doesn't like SL medications, spoke with Palliative about this, see note. Patient did agree to try SL dilaudid for pain once this evening.  She felt like she had some difficulty breathing this evening O2 at 97%, offered ativan for anxiety but patient declined when offered.  She did take a few bites of lemon fruit ice tonight.  Asymptomatic covid ordered to be drawn this evening, plan to discharge to hospice facility pending placement.  Will continue to monitor.

## 2020-08-23 NOTE — PROGRESS NOTES
"[facility]    Medicine Progress Note - Hospitalist Service       Date of Admission:  8/9/2020  Assessment & Plan   Cherrie Crespo is a 70-year-old female who presented to the ER with increasing shortness of breath and difficulty swallowing.  She had known laryngeal cancer and was recently started on radiation therapy.  ENT was consulted and she went to the operating room for a tracheostomy.      Stage IV laryngeal cancer  Airway obstruction  -Status post tracheostomy with direct laryngoscopy on August 9, 2020 by Dr. Magdaleno Meza.    -Cuffed trach tube deflated by ENT on 8/11/20.  Now speaking valve.  -Palliative care consulted.  -Patient transitioned to comfort cares on 8/11/20.  -PRN comfort care medications available.  -Has difficulty swallowing. Suspect this is due to her cancer;  No additional diagnostic evaluation as patient desired transition to comfort care, in fact she does not want to take in oral food/nutrition as she does not want to \"prolong her life\".  Patient taking water for comfort  .-Was on high flow oxygen, compassionately discontinued on 8/12/20 per her wishes to transition to comfort care..  -Patient now receptive to evaluating Hospice options after discussion with SW and brother-in-law Yevgeniy; [facility] and [facility].  COVID for discharge planning 8/22/2020 NEG.   - Nursing reports poor IV access, discussed with patient sublingual route, patient was adamant that that causes \"difficulty to breathe\".  It appears volume is between 0.2 and 0.25 mL's, consistent with what patient takes in per water/ice chips; discussed with Palliative Care, will continue to promote sublingual route as will have no IV route possible on transition to Hospice.  Trial high concentration sublingual PRN morphine        Diet: Regular Diet Adult    DVT Prophylaxis: none, comfort cares  Wolfe Catheter: not present  Code Status: DNR/DNI/comfort cares         Disposition Plan   Expected discharge: " TBD once Hospice disposition established.May Walton MD  Hospitalist Service  [facility]    ______________________________________________________________________    Interval History   Patient appears more calm today after decision to pursue Hospice option after discussion with SW and brother-in-law.  Decreased secretions around speaking valve.  Patient more comfortable.         Physical Exam   Vital Signs: Temp: 96.9  F (36.1  C) Temp src: Oral BP: 129/73 Pulse: 71   Resp: 14 SpO2: 96 % O2 Device: None (Room air)    Weight: 118 lbs 6.19 oz  Constitutional.  NAD, alert, Appears comfortable, affect brighter today, marked decrease in secretions around speaking valve

## 2020-08-23 NOTE — PROGRESS NOTES
Called by nursing requesting different pain medication. Patient doesn't like the sublingual dilaudid. Will try switching to sublingual morphine. Dr. Walton's note from earlier today reviewed, not sure if switching to sublingual morphine will be any better tolerated, but will try it and see how she does.  Juan Francisco Barker MD

## 2020-08-23 NOTE — PROGRESS NOTES
"PIEDAD    D: PIEDAD following for discharge planning. PIEDAD spoke with Yoana to update patient's COVID-19 test came back negative. Yoana states \"actually the higher ups have decided to decline patient for admission\". PIEDAD inquired about reason for denial and Yoana report admissions staff are concerned about secretion from trach. PIEDAD to explore bed  availability at [facility]    P: Will continue to follow.     PIEDAD faxed application to [facility].   "

## 2020-08-23 NOTE — PLAN OF CARE
7-11 PM: Alert and oriented but forgetful at times, anxious at times. Up with SBA to bedside commode. Regular diet but no appetite. Unable to take pills. Trach intact.  Had SL dilaudid on prior shift but she did not feel that it helped.  MD changed to SL morphine and patient received one dose. Patient is comfort cares and will discharge pending placement.

## 2020-08-23 NOTE — PLAN OF CARE
DATE & TIME: 8/22/2020 [id number]    Cognitive Concerns/ Orientation : A & O x 4   BEHAVIOR & AGGRESSION TOOL COLOR: Green  ABNL VS/O2: VSS on RA  MOBILITY: SBA to commode  PAIN MANAGMENT: Denies  DIET: Regular, no appetite  BOWEL/BLADDER: Continent of B & B  ABNL LAB/BG: N/a  DRAIN/DEVICES: PIV SL  TELEMETRY RHYTHM: N/a  SKIN: Bruised  TESTS/PROCEDURES: Asymptomatic COVID test collected, results pending  D/C DAY/GOALS/PLACE: Pending placement & negative COVID result  OTHER IMPORTANT INFO: Comfort cares. Trach intact.

## 2020-08-23 NOTE — PROVIDER NOTIFICATION
MD Notification    Notified Person: MD    Notified Person Name: Mj    Notification Date/Time: 8/22/20 2025    Notification Interaction: spoke to MD    Purpose of Notification: Patient would prefer to have SL morphine instead of SL dilaudid for pain control.     Orders Received: MD to change order to morphine instead of dilaudid    Comments:

## 2020-08-23 NOTE — PLAN OF CARE
Comfort cares.  Up to bedside commode with assist of 1.  Denied pain until this evening.  Patient refusing oral and rectal medications, spoke with Palliative, see note.  Trach.  Discharge pending placement.  Will continue to monitor.

## 2020-08-24 PROCEDURE — 40000275 ZZH STATISTIC RCP TIME EA 10 MIN

## 2020-08-24 PROCEDURE — 25000125 ZZHC RX 250: Performed by: NURSE PRACTITIONER

## 2020-08-24 PROCEDURE — 25000132 ZZH RX MED GY IP 250 OP 250 PS 637: Performed by: STUDENT IN AN ORGANIZED HEALTH CARE EDUCATION/TRAINING PROGRAM

## 2020-08-24 PROCEDURE — 12000000 ZZH R&B MED SURG/OB

## 2020-08-24 PROCEDURE — 99233 SBSQ HOSP IP/OBS HIGH 50: CPT | Performed by: NURSE PRACTITIONER

## 2020-08-24 PROCEDURE — 25000132 ZZH RX MED GY IP 250 OP 250 PS 637: Performed by: HOSPITALIST

## 2020-08-24 PROCEDURE — 25000128 H RX IP 250 OP 636: Performed by: HOSPITALIST

## 2020-08-24 PROCEDURE — 40000008 ZZH STATISTIC AIRWAY CARE

## 2020-08-24 PROCEDURE — 99232 SBSQ HOSP IP/OBS MODERATE 35: CPT | Performed by: STUDENT IN AN ORGANIZED HEALTH CARE EDUCATION/TRAINING PROGRAM

## 2020-08-24 RX ORDER — FENTANYL 12.5 UG/1
12 PATCH TRANSDERMAL
Status: DISCONTINUED | OUTPATIENT
Start: 2020-08-24 | End: 2020-08-25 | Stop reason: HOSPADM

## 2020-08-24 RX ADMIN — ONDANSETRON 4 MG: 4 TABLET, ORALLY DISINTEGRATING ORAL at 07:44

## 2020-08-24 RX ADMIN — MORPHINE SULFATE 10 MG: 100 SOLUTION ORAL at 07:19

## 2020-08-24 RX ADMIN — ATROPINE SULFATE 2 DROP: 10 SOLUTION/ DROPS OPHTHALMIC at 07:45

## 2020-08-24 RX ADMIN — FENTANYL 1 PATCH: 12 PATCH TRANSDERMAL at 08:19

## 2020-08-24 ASSESSMENT — ACTIVITIES OF DAILY LIVING (ADL)
ADLS_ACUITY_SCORE: 13

## 2020-08-24 NOTE — PLAN OF CARE
Comfort cares.  A&Ox4.  VS and full assessment deferred. No IV access.  Patient complained of occasional pain and nausea throughout the night; declined interventions.  Trach with some secretions; patient declining suctioning.  Assist x 1 to BSC.  Discharge pending hospice placement.

## 2020-08-24 NOTE — PROGRESS NOTES
RESPIRATORY CARE NOTE:   Pt trach cares completed. Sx'd pt trach stoma. Place HME on end of trach. Filled the cool aerosol up with water for the trach dome for comfort as well.   RT will continue to follow pt.     Leigha Gutierrez, RT

## 2020-08-24 NOTE — PROGRESS NOTES
"[facility]    Medicine Progress Note - Hospitalist Service       Date of Admission:  8/9/2020  Assessment & Plan   Cherrie Crespo is a 70-year-old female who presented to the ER with increasing shortness of breath and difficulty swallowing.  She had known laryngeal cancer and was recently started on radiation therapy.  ENT was consulted and she went to the operating room for a tracheostomy. Pt now pursuing comfort care/hospice.     Stage IV laryngeal cancer  Airway obstruction  -Status post tracheostomy with direct laryngoscopy on August 9, 2020 by Dr. Magdaleno Meza.    -Cuffed trach tube deflated by ENT on 8/11/20.  Now speaking valve.  -Palliative care consulted.  -Patient transitioned to comfort cares on 8/11/20.  -PRN comfort care medications available.  -Has difficulty swallowing. Suspect this is due to her cancer;  No additional diagnostic evaluation as patient desired transition to comfort care, in fact she does not want to take in oral food/nutrition as she does not want to \"prolong her life\".  Patient taking water for comfort  .-Was on high flow oxygen, compassionately discontinued on 8/12/20 per her wishes to transition to comfort care..  -Patient now receptive to evaluating Hospice options after discussion with SW and brother-in-law Yevgeniy; [facility] and [facility].  COVID for discharge planning 8/22/2020 NEG.   - Pt currently declining all pain and anxiety medication   - sublingual morphine, IV dilaudid, IV/PO ativan and fentanyl patch available if she chooses.       Diet: Regular Diet Adult    DVT Prophylaxis: none, comfort cares  Wolfe Catheter: not present  Code Status: DNR/DNI/comfort cares         Disposition Plan   Expected discharge: TBD once Hospice disposition established    .Reanna Matthews MD  Hospitalist Service  [facility]    ______________________________________________________________________    Interval History   Patient Reports feeling " "pain, difficulty breathing and dizziness today. She declines any intervention for these symptoms. She again reports that she \"just wants to die.\"     Physical Exam   Vital Signs: Temp: 96.5  F (35.8  C) Temp src: Oral BP: 119/75 Pulse: 91   Resp: 16 SpO2: 98 % O2 Device: None (Room air)    Weight: 118 lbs 6.19 oz  Constitutional.  Anxious, tearful, not distressed, non-labored breathing.         fentaNYL  12 mcg Transdermal Q72H     fentaNYL   Transdermal Q8H     "

## 2020-08-24 NOTE — PROGRESS NOTES
"[facility]  Palliative Care Daily Progress Note       Recommendations & Counseling       Comfort measures only    Pt declining oral meds due to taste and side effects (nausea/dizziness) she has tried Morphine and Dilaudid - of note, she has lost IV access and multiple attempts have been made to replace without success    Agree with adding Fentanyl 12 mcg patch to alleviate sx's of pain and dyspnea, low threshold to increase dose if pt tolerates     Continue to have available SL Morphine prn pain/dyspnea     Continue to have available SL Lorazepam prn anxiety        Assessments          Cherrie Crespo is a 70-year-old female who presented to the ER with increasing shortness of breath and difficulty swallowing.  She had known laryngeal cancer and was recently started on radiation therapy.  ENT was consulted and she went to the operating room for a tracheostomy. She since made the decision to transition to a comfort focused plan of care.     Today, the patient was seen for:  Cancer related, end of life symptom management    Prognosis, Goals, or Advance Care Planning was addressed today with: Yes.  Mood, coping, and/or meaning in the context of serious illness were addressed today: Yes.  Summary/Comments:    Checked in with Cherrie this morning. She tells me she had a \"rough\" night, but was feeling better today. She tells me she is not going to take any more opioids by mouth as they taste terrible, cause nausea and she doesn't feel the benefits outweighs the burdens. She had a Fentanyl patch placed earlier today which she is fine with. Cherrie again expressed her desire to die, is struggling to understand why it is taking so long.     Thank you for involving us in the care of this patient and family. We will continue to follow. Please do not hesitate to contact me with questions or concerns or the on-call provider for our team if evening or weekend.    Nika TALAMANTES, CNP  Palliative " "Medicine  [phone] (team pager)  [phone] (after hours/weekends)    Attestation:  Total time on the floor involved in the patient's care: 35 minutes  Total time spent in counseling/care coordination: >50%            Interval History:     Chart review/discussion with unit or clinical team members:   Discussed pt's bedside RN     Per patient or family/caregivers today:  Pt seen resting in bed, her brother in law Yevgeniy at bedside. As above, states she had a rough night with abd pain and some \"swelling\" in the back of her neck which caused discomfort.     Key Palliative Symptoms:  # Pain severity the last 12 hours: low  # Dyspnea severity the last 12 hours: moderate  # Nausea severity the last 12 hours: low  # Anxiety severity the last 12 hours: not assessed    Patient is on opioids: bowels not assessed today.           Review of Systems:     Besides above, ROS was reviewed and is unremarkable          Medications:     I have reviewed this patient's medication profile and medications during this hospitalization.    Noted meds:    Current Facility-Administered Medications Ordered in Epic   Medication Dose Route Frequency Last Rate Last Dose     artificial saliva (BIOTENE MT) spray 1 spray  1 spray Mouth/Throat Q6H PRN   1 spray at 08/22/20 2006     atropine 1 % ophthalmic solution 1-2 drop  1-2 drop Sublingual Q1H PRN   2 drop at 08/24/20 0745     carboxymethylcellulose PF (REFRESH PLUS) 0.5 % ophthalmic solution 1-2 drop  1-2 drop Both Eyes Q8H PRN         fentaNYL (DURAGESIC) 12 mcg/hr 72 hr patch 1 patch  12 mcg Transdermal Q72H   1 patch at 08/24/20 0819     fentaNYL (DURAGESIC) Patch in Place   Transdermal Q8H         glycopyrrolate (ROBINUL) injection 0.2-0.4 mg  0.2-0.4 mg Intravenous Q4H PRN   0.4 mg at 08/21/20 2024     HYDROmorphone (PF) (DILAUDID) injection 0.5-1 mg  0.5-1 mg Intravenous Q15 Min PRN   0.5 mg at 08/22/20 0802     LORazepam (ATIVAN) 2 MG/ML (HIGH CONC) solution 0.5 mg  0.5 " mg Oral Q4H PRN   0.5 mg at 08/21/20 1139     LORazepam (ATIVAN) injection 0.5 mg  0.5 mg Intravenous Q2H PRN   0.5 mg at 08/19/20 1116     morphine sulfate HIGH CONCENTRATE (ROXANOL) 10 mg/0.5 mL (HIGH CONC) solution 10-20 mg  10-20 mg Oral Q2H PRN   10 mg at 08/24/20 0719     naloxone (NARCAN) injection 0.1-0.4 mg  0.1-0.4 mg Intravenous Q2 Min PRN         ondansetron (ZOFRAN-ODT) ODT tab 4 mg  4 mg Oral Q6H PRN   4 mg at 08/24/20 0744    Or     ondansetron (ZOFRAN) injection 4 mg  4 mg Intravenous Q6H PRN   4 mg at 08/19/20 1718     phenol (CHLORASEPTIC) 1.4 % spray 1 mL  1 spray Mouth/Throat Q1H PRN   1 mL at 08/11/20 1934     No current [facility]-ordered outpatient medications on file.              Physical Exam:                 O2 Device: None (Room air)     Vitals:    08/09/20 1700 08/10/20 0000 08/11/20 0600   Weight: 51.8 kg (114 lb 3.2 oz) 51.8 kg (114 lb 3.2 oz) 53.7 kg (118 lb 6.2 oz)     CONSTITUTIONAL: NAD, A&Ox3. Calm and cooperative.  HEENT: NCAT, MMM, trach present  CARDIOVASCULAR: exam deferred   RESPIRATORY: regular respiratory effort noted  GASTROINTESTINAL: exam deferred  MUSCULOSKELETAL: Moving freely in bed   SKIN: Warm and intact. No concerning lesions or rashes on exposed skin surfaces   NEUROLOGIC: Appropriately responsive during interview  PSYCH: Affect congruent            Data Reviewed:     Reviewed recent pertinent imaging, comments:   n/a    Reviewed recent labs, comments:   No results found for this or any previous visit (from the past 24 hour(s)).

## 2020-08-24 NOTE — PLAN OF CARE
Comfort cares. AOx4. VS and full assessment deferred. A1 pivot to BSC, independently positioning in bed. Regular diet, ate 3 lemon fruit ice. No IV access. PRN PO zofran given for nausea, effective. Trach with secretions, suctioned 3x this shift, PRN PO atropine given, effective. Fentanyl patch on upper left back, PRN roxanol and hot packs for pain. Plan for discharge tomorrow morning 0930 to [facility] via wheelchair transport.

## 2020-08-24 NOTE — PROGRESS NOTES
"SP[facility]UAL [facility] Progress Note  [facility]    Follow-up visit with pt.  She described some of the pain and troubles she's been having lately with her cancer.  She acknowledges that she is really ready to die, and this \"in between time\" is going on longer and becoming more difficult for her.  Pt showed me some of the notes and cards that she's received from family and friends, and we were able to talk together about the blessings she is experiencing during this understandably challenging time.  Provided reflective listening, conversation, and prayer.  I will continue to follow.                                                                                                                                                 Aissatou Lujan M.A.  Staff   Pager [phone]  Phone [phone]      "

## 2020-08-24 NOTE — PROGRESS NOTES
PIEDAD Note:    D/I:  PIEDAD received call from [facility] with [facility] who called to state that they would be able to accept patient tomorrow to their facility.  Requesting documentation on Covid screen from yesterday.  PIEDAD faxed Covid screen. [facility] requesting that patient be at facility by 10:00.  PIEDAD placed call to [facility] to arrange for a wheelchair ride for patient at 9:30 on 8/25.  Updated patient and discussed private pay fees for transport and she is in understanding.      Per [facility], they are able to accept patient with her Trach, however, they are not able to suction patient at their facility.  They will be able to use Atropine to assist with secretions.  PIEDAD discussed this with bedside nurses present and patient.  Patient in understanding and stating she is in agreement with this.      JOHNNIE Wiley, LGSW  [phone]  [facility]

## 2020-08-25 VITALS
RESPIRATION RATE: 20 BRPM | BODY MASS INDEX: 20.98 KG/M2 | HEART RATE: 91 BPM | TEMPERATURE: 96.5 F | WEIGHT: 118.39 LBS | OXYGEN SATURATION: 97 % | DIASTOLIC BLOOD PRESSURE: 75 MMHG | HEIGHT: 63 IN | SYSTOLIC BLOOD PRESSURE: 119 MMHG

## 2020-08-25 PROCEDURE — 99207 ZZC CDG-CODE INCORRECT PER BILLING BASED ON TIME: CPT | Performed by: STUDENT IN AN ORGANIZED HEALTH CARE EDUCATION/TRAINING PROGRAM

## 2020-08-25 PROCEDURE — 99239 HOSP IP/OBS DSCHRG MGMT >30: CPT | Performed by: STUDENT IN AN ORGANIZED HEALTH CARE EDUCATION/TRAINING PROGRAM

## 2020-08-25 PROCEDURE — 40000275 ZZH STATISTIC RCP TIME EA 10 MIN

## 2020-08-25 RX ORDER — ATROPINE SULFATE 10 MG/ML
1-2 SOLUTION/ DROPS OPHTHALMIC
Qty: 1 BOTTLE | Refills: 0 | Status: SHIPPED | OUTPATIENT
Start: 2020-08-25

## 2020-08-25 RX ORDER — FENTANYL 12.5 UG/1
1 PATCH TRANSDERMAL
Qty: 1 PATCH | Refills: 0 | Status: SHIPPED | OUTPATIENT
Start: 2020-08-27

## 2020-08-25 RX ORDER — CARBOXYMETHYLCELLULOSE SODIUM 5 MG/ML
1-2 SOLUTION/ DROPS OPHTHALMIC EVERY 8 HOURS PRN
Qty: 1 BOTTLE | Refills: 0 | Status: SHIPPED | OUTPATIENT
Start: 2020-08-25

## 2020-08-25 RX ORDER — MORPHINE SULFATE 100 MG/5ML
10-20 SOLUTION ORAL
Qty: 15 ML | Refills: 0 | Status: SHIPPED | OUTPATIENT
Start: 2020-08-25

## 2020-08-25 RX ORDER — ONDANSETRON 4 MG/1
4 TABLET, ORALLY DISINTEGRATING ORAL EVERY 6 HOURS PRN
Qty: 10 TABLET | Refills: 0 | Status: SHIPPED | OUTPATIENT
Start: 2020-08-25

## 2020-08-25 RX ORDER — LORAZEPAM 2 MG/ML
0.5 CONCENTRATE ORAL EVERY 4 HOURS PRN
Qty: 30 ML | Refills: 0 | Status: SHIPPED | OUTPATIENT
Start: 2020-08-25

## 2020-08-25 ASSESSMENT — ACTIVITIES OF DAILY LIVING (ADL)
ADLS_ACUITY_SCORE: 15
ADLS_ACUITY_SCORE: 15
ADLS_ACUITY_SCORE: 13

## 2020-08-25 NOTE — PLAN OF CARE
Comfort cares continue. Full assessment and VS deferred. A&Ox4, but is forgetful @ times. Denied any pain overnight. Denied the need for any PRN medications. Fentanyl patch in place on left upper arm. Patient whispers when talking. Trach in place, frequent cough when talking. No need for any suction overnight per patient. No IV access. Mepilex applied to coccyx for protection; blanchable redness in that area. Patient repo's by self in bed. Up with A1 and pivot to BSC. Plan for pt to discharge to [facility] today at 0930 via wheelchair. Continue to keep comfortable.

## 2020-08-25 NOTE — PROGRESS NOTES
SW  D:/  Patient to transport to [facility] for hospice today, 8/25/20 via [facility] wheelchair transport at 9:30. Received discharge orders, orders faxed to [facility] [address] [facility].     Update: Patient now to transfer via [facility] stretcher ride. Ambulance PCS filled out and faxed to [facility].     JOHNNIE Squires, [facility]  [phone]

## 2020-08-25 NOTE — PLAN OF CARE
Comfort cares. Discharge to [facility]. Patient quite anxious and c/o shortness of breath but refuses prn medications. Provided lots of reassurance. RT suctioned x2. Patient had anxiety when wheelchair transport arrived; wheelchair transport unable to take patient due to c/o SOB. Ambulance transport set up. MD, RN, and SW all discussed with patient importance of utilizing meds available such as atropine to manage symptoms as this is what will be available to her at the facility. No PIV. Belongings sent with pt. AVS gone over with patient. Brother in law present at [facility]. Patient left unit via stretcher transport at 1055 to discharge to [facility].

## 2020-08-25 NOTE — PLAN OF CARE
Comfort cares. AOx4. No IV.  Up with A1 to bedside commode to void.  Repositioning independently within bed.  Blanchable redness on coccyx.  Fentanyl patch on left shoulder.  Denied pain.  Refusing all medications.  Suction trach this shift x2 per pt request, and changed humidification trach piece.  Output via trach yellowish and thick.  Poor appetite.  Pt stated she wants to die.  Plan for pt to discharge to [facility] tomorrow at 0930 via wheelchair.  Nursing will continue to monitor.

## 2020-08-25 NOTE — DISCHARGE SUMMARY
"[facility]  Hospitalist Discharge Summary      Date of Admission:  8/9/2020  Date of Discharge:  8/25/2020  Discharging Provider: Reanna Matthews MD      Discharge Diagnoses   Stage IV laryngeal cancer  Airway obstruction    Follow-ups Needed After Discharge   Follow-up Appointments     Follow Up      No follow-up needed             Unresulted Labs Ordered in the Past 30 Days of this Admission     No orders found from 7/10/2020 to 8/10/2020.      These results will be followed up by NA    Discharge Disposition   Admited to hospice care.    Condition at discharge: Terminal    Hospital Course   Cherrie Crespo is a 70-year-old female who presented to the ER with increasing shortness of breath and difficulty swallowing.  She had known laryngeal cancer and was recently started on radiation therapy.  ENT was consulted and she went to the operating room for a tracheostomy. After further discussion, the patient elected to pursue comfort care/hospice.      Stage IV laryngeal cancer  Airway obstruction  -Status post tracheostomy with direct laryngoscopy on August 9, 2020 by Dr. Magdaleno Mzea.    -Cuffed trach tube deflated by ENT on 8/11/20.  Now speaking valve.  -Palliative care consulted.  -Patient transitioned to comfort cares on 8/11/20.  -PRN comfort care medications available.  -Has difficulty swallowing. Suspect this is due to her cancer;  No additional diagnostic evaluation as patient desired transition to comfort care, in fact she does not want to take in oral food/nutrition as she does not want to \"prolong her life\".  Patient taking water for comfort  .-Was on high flow oxygen, compassionately discontinued on 8/12/20 per her wishes to transition to comfort care..  -[facility]   -COVID for discharge planning 8/22/2020 NEG.   -Pt currently declining all pain and anxiety medication   -sublingual morphine, IV dilaudid, IV/PO ativan and fentanyl patch available if she " chooses.     Consultations This Hospital Stay   SWALLOW EVAL SPEECH PATH AT BEDSIDE IP CONSULT  CARE COORDINATOR IP CONSULT  SOCIAL WORK IP CONSULT  SPEECH LANGUAGE PATH ADULT IP CONSULT  PASSY PATTI VALVE WITH CUFF DEFLATION IP CONSULT  NUTRITION SERVICES ADULT IP CONSULT  CARE TRANSITION RN/SW IP CONSULT  PALLIATIVE CARE ADULT IP CONSULT  SOCIAL WORK IP CONSULT  SPIRITUAL HEALTH SERVICES IP CONSULT  CARE TRANSITION RN/SW IP CONSULT  SPIRITUAL HEALTH SERVICES IP CONSULT    Code Status   No CPR- Do NOT Intubate    Time Spent on this Encounter   I, Reanna Matthews MD, personally saw the patient today and spent greater than 30 minutes discharging this patient.       Reanna Matthews MD  [facility]  ______________________________________________________________________    Physical Exam   Vital Signs: Temp: 96.5  F (35.8  C) Temp src: Oral BP: 119/75 Pulse: 91   Resp: 20 SpO2: 98 % O2 Device: None (Room air)    Weight: 118 lbs 6.19 oz  General Appearance: Awake, alert, ancious  Respiratory: No increased work of breathing, trach present  Psych: Very anxious. Reports pain and shortness of breath but declines any medication.     Primary Care Physician   Gustavo Crespo    Discharge Orders      General info for SNF    Length of Stay Estimate: Short Term Care: Estimated # of Days <30  Condition at Discharge: Terminal  Level of care:skilled   Rehabilitation Potential: Poor  Admission H&P remains valid and up-to-date: Yes  Recent Chemotherapy: N/A  Use Nursing Home Standing Orders: Yes     Reason for your hospital stay    You were hospitalized for airway obstruction from laryngeal cancer.     Activity - Up ad chema     Follow Up    No follow-up needed     No CPR- Do NOT Intubate     Advance Diet as Tolerated    Follow this diet upon discharge: Orders Placed This Encounter      Regular Diet Adult       Significant Results and Procedures   None    Discharge Medications   Current Discharge Medication List       START taking these medications    Details   artificial saliva (BIOTENE MT) AERS spray Take 1 spray by mouth every 6 hours as needed for dry mouth  Qty: 1 Bottle, Refills: 0    Associated Diagnoses: End of life care      atropine 1 % ophthalmic solution Place 1-2 drops under the tongue every hour as needed for other (secretions)  Qty: 1 Bottle, Refills: 0    Associated Diagnoses: End of life care      carboxymethylcellulose PF (REFRESH PLUS) 0.5 % ophthalmic solution Place 1-2 drops into both eyes every 8 hours as needed for dry eyes  Qty: 1 Bottle, Refills: 0    Associated Diagnoses: End of life care      fentaNYL (DURAGESIC) 12 mcg/hr 72 hr patch Place 1 patch onto the skin every 72 hours remove old patch.  Qty: 1 patch, Refills: 0    Associated Diagnoses: End of life care      LORazepam (ATIVAN) 2 MG/ML (HIGH CONC) solution Take 0.25 mLs (0.5 mg) by mouth every 4 hours as needed for anxiety or agitation  Qty: 30 mL, Refills: 0    Associated Diagnoses: End of life care      morphine sulfate HIGH CONCENTRATE (ROXANOL) 10 mg/0.5 mL (HIGH CONC) solution Take 0.5-1 mLs (10-20 mg) by mouth every 2 hours as needed for moderate to severe pain or shortness of breath / dyspnea  Qty: 15 mL, Refills: 0    Associated Diagnoses: End of life care      ondansetron (ZOFRAN-ODT) 4 MG ODT tab Take 1 tablet (4 mg) by mouth every 6 hours as needed for nausea or vomiting  Qty: 10 tablet, Refills: 0    Associated Diagnoses: End of life care      phenol (CHLORASEPTIC) 1.4 % spray Take 1 spray (1 mL) by mouth every hour as needed for sore throat  Qty: 1 Bottle, Refills: 0    Associated Diagnoses: End of life care           Allergies   Allergies   Allergen Reactions     Penicillins Shortness Of Breath and Rash     Sulfa Drugs Shortness Of Breath and Rash

## 2023-09-02 NOTE — PLAN OF CARE
Pt remained on HF 30% and 20L throughout the day. Denied pain and anxiety. Pt appeared calm and content. Up to bathroom with SBA. Up in chair. Tolerating trach very well. Scant secretions. Transferring out of ICU. Pt updated on plan of care. Continue to monitor.   Temo Myers(Resident)

## (undated) DEVICE — NDL 19GA 1.5"

## (undated) DEVICE — NDL SPINAL 22GA 7" QUINCKE 405149

## (undated) DEVICE — TUBING SUCTION 12"X1/4" N612

## (undated) DEVICE — LINEN FULL SHEET 5511

## (undated) DEVICE — LINEN ENT PACK 5412

## (undated) DEVICE — GLOVE PROTEXIS MICRO 8.5  2D73PM85

## (undated) DEVICE — ESU GROUND PAD ADULT W/CORD E7507

## (undated) DEVICE — ESU PENCIL W/HOLSTER E2350H

## (undated) DEVICE — LINEN HALF SHEET 5512

## (undated) DEVICE — DRSG TELFA 3X8" 1238

## (undated) DEVICE — PACK SET-UP STD 9102

## (undated) DEVICE — PACK T&A RIDGES

## (undated) DEVICE — DRSG DRAIN 4X4" 7086

## (undated) DEVICE — PEN MARKING SKIN W/LABELS 31145884

## (undated) DEVICE — SU VICRYL 3-0 TIE 12X18" J904T

## (undated) DEVICE — BAG CLEAR TRASH 1.3M 39X33" P4040C

## (undated) DEVICE — SOL NACL 0.9% IRRIG 1000ML BOTTLE 2F7124

## (undated) DEVICE — GLOVE PROTEXIS MICRO 8.0  2D73PM80

## (undated) DEVICE — GLOVE PROTEXIS MICRO 7.5  2D73PM75

## (undated) DEVICE — DRAPE SHEET REV FOLD 3/4 9349

## (undated) DEVICE — SUCTION CANISTER MEDIVAC LINER 3000ML W/LID 65651-530

## (undated) DEVICE — LINEN TOWEL PACK X5 5464

## (undated) DEVICE — Device

## (undated) DEVICE — SPONGE COTTONOID 1/2X3" 80-1407

## (undated) DEVICE — BASIN SET MINOR DISP

## (undated) DEVICE — SPONGE RAY-TEC 4X4" 7317

## (undated) DEVICE — SOL WATER IRRIG 1000ML BOTTLE 2F7114

## (undated) DEVICE — DRAPE SPLIT EENT 76X124" 3X28" 9447

## (undated) DEVICE — GOWN IMPERVIOUS SPECIALTY XLG/XLONG 32474

## (undated) DEVICE — SYR EAR BULB 3OZ 0035830

## (undated) DEVICE — GLOVE PROTEXIS W/NEU-THERA 8.5  2D73TE85

## (undated) DEVICE — DRSG TELFA 2X3"

## (undated) DEVICE — SOL WATER IRRIG 1000ML BOTTLE 07139-09

## (undated) DEVICE — GOWN XLG DISP 9545

## (undated) RX ORDER — PROPOFOL 10 MG/ML
INJECTION, EMULSION INTRAVENOUS
Status: DISPENSED
Start: 2020-07-10

## (undated) RX ORDER — PROPOFOL 10 MG/ML
INJECTION, EMULSION INTRAVENOUS
Status: DISPENSED
Start: 2020-08-09

## (undated) RX ORDER — LIDOCAINE HYDROCHLORIDE 20 MG/ML
INJECTION, SOLUTION EPIDURAL; INFILTRATION; INTRACAUDAL; PERINEURAL
Status: DISPENSED
Start: 2020-08-09

## (undated) RX ORDER — DEXAMETHASONE SODIUM PHOSPHATE 10 MG/ML
INJECTION, SOLUTION INTRAMUSCULAR; INTRAVENOUS
Status: DISPENSED
Start: 2020-08-09

## (undated) RX ORDER — LIDOCAINE HYDROCHLORIDE AND EPINEPHRINE 10; 10 MG/ML; UG/ML
INJECTION, SOLUTION INFILTRATION; PERINEURAL
Status: DISPENSED
Start: 2020-07-10

## (undated) RX ORDER — HYDRALAZINE HYDROCHLORIDE 20 MG/ML
INJECTION INTRAMUSCULAR; INTRAVENOUS
Status: DISPENSED
Start: 2020-07-10

## (undated) RX ORDER — GLYCOPYRROLATE 0.2 MG/ML
INJECTION INTRAMUSCULAR; INTRAVENOUS
Status: DISPENSED
Start: 2020-07-10

## (undated) RX ORDER — OXYMETAZOLINE HYDROCHLORIDE 0.05 G/100ML
SPRAY NASAL
Status: DISPENSED
Start: 2020-01-31

## (undated) RX ORDER — NEOSTIGMINE METHYLSULFATE 1 MG/ML
VIAL (ML) INJECTION
Status: DISPENSED
Start: 2020-07-10

## (undated) RX ORDER — GLYCOPYRROLATE 0.2 MG/ML
INJECTION, SOLUTION INTRAMUSCULAR; INTRAVENOUS
Status: DISPENSED
Start: 2020-01-31

## (undated) RX ORDER — EPINEPHRINE 1 MG/ML(1)
AMPUL (ML) INJECTION
Status: DISPENSED
Start: 2020-07-10

## (undated) RX ORDER — ONDANSETRON 2 MG/ML
INJECTION INTRAMUSCULAR; INTRAVENOUS
Status: DISPENSED
Start: 2020-08-09

## (undated) RX ORDER — FENTANYL CITRATE 0.05 MG/ML
INJECTION, SOLUTION INTRAMUSCULAR; INTRAVENOUS
Status: DISPENSED
Start: 2020-01-31

## (undated) RX ORDER — NEOSTIGMINE METHYLSULFATE 1 MG/ML
VIAL (ML) INJECTION
Status: DISPENSED
Start: 2020-01-31

## (undated) RX ORDER — FENTANYL CITRATE 50 UG/ML
INJECTION, SOLUTION INTRAMUSCULAR; INTRAVENOUS
Status: DISPENSED
Start: 2020-08-09

## (undated) RX ORDER — LABETALOL HYDROCHLORIDE 5 MG/ML
INJECTION, SOLUTION INTRAVENOUS
Status: DISPENSED
Start: 2020-01-31

## (undated) RX ORDER — EPINEPHRINE 1 MG/ML
INJECTION, SOLUTION INTRAMUSCULAR; SUBCUTANEOUS
Status: DISPENSED
Start: 2020-01-31

## (undated) RX ORDER — DEXAMETHASONE SODIUM PHOSPHATE 4 MG/ML
INJECTION, SOLUTION INTRA-ARTICULAR; INTRALESIONAL; INTRAMUSCULAR; INTRAVENOUS; SOFT TISSUE
Status: DISPENSED
Start: 2020-01-31

## (undated) RX ORDER — LIDOCAINE HYDROCHLORIDE 10 MG/ML
INJECTION, SOLUTION EPIDURAL; INFILTRATION; INTRACAUDAL; PERINEURAL
Status: DISPENSED
Start: 2020-07-10

## (undated) RX ORDER — EPINEPHRINE NASAL SOLUTION 1 MG/ML
SOLUTION NASAL
Status: DISPENSED
Start: 2020-07-10

## (undated) RX ORDER — PROPOFOL 10 MG/ML
INJECTION, EMULSION INTRAVENOUS
Status: DISPENSED
Start: 2020-01-31

## (undated) RX ORDER — FENTANYL CITRATE 50 UG/ML
INJECTION, SOLUTION INTRAMUSCULAR; INTRAVENOUS
Status: DISPENSED
Start: 2020-07-10

## (undated) RX ORDER — ONDANSETRON 2 MG/ML
INJECTION INTRAMUSCULAR; INTRAVENOUS
Status: DISPENSED
Start: 2020-01-31

## (undated) RX ORDER — CLINDAMYCIN PHOSPHATE 900 MG/50ML
INJECTION, SOLUTION INTRAVENOUS
Status: DISPENSED
Start: 2020-08-09

## (undated) RX ORDER — REMIFENTANIL HYDROCHLORIDE 1 MG/ML
INJECTION, POWDER, LYOPHILIZED, FOR SOLUTION INTRAVENOUS
Status: DISPENSED
Start: 2020-01-31

## (undated) RX ORDER — LIDOCAINE HYDROCHLORIDE 20 MG/ML
INJECTION, SOLUTION EPIDURAL; INFILTRATION; INTRACAUDAL; PERINEURAL
Status: DISPENSED
Start: 2020-01-31

## (undated) RX ORDER — LABETALOL 20 MG/4 ML (5 MG/ML) INTRAVENOUS SYRINGE
Status: DISPENSED
Start: 2020-07-10

## (undated) RX ORDER — DEXAMETHASONE SODIUM PHOSPHATE 4 MG/ML
INJECTION, SOLUTION INTRA-ARTICULAR; INTRALESIONAL; INTRAMUSCULAR; INTRAVENOUS; SOFT TISSUE
Status: DISPENSED
Start: 2020-07-10